# Patient Record
Sex: FEMALE | Race: WHITE | NOT HISPANIC OR LATINO | Employment: OTHER | URBAN - METROPOLITAN AREA
[De-identification: names, ages, dates, MRNs, and addresses within clinical notes are randomized per-mention and may not be internally consistent; named-entity substitution may affect disease eponyms.]

---

## 2017-01-04 ENCOUNTER — APPOINTMENT (OUTPATIENT)
Dept: URGENT CARE | Age: 77
End: 2017-01-04
Payer: MEDICARE

## 2017-01-04 ENCOUNTER — HOSPITAL ENCOUNTER (OUTPATIENT)
Dept: RADIOLOGY | Age: 77
Discharge: HOME | End: 2017-01-04
Attending: FAMILY MEDICINE | Admitting: FAMILY MEDICINE
Payer: MEDICARE

## 2017-01-04 ENCOUNTER — TRANSCRIBE ORDERS (OUTPATIENT)
Dept: URGENT CARE | Age: 77
End: 2017-01-04

## 2017-01-04 DIAGNOSIS — S99.929A INJURY OF FOOT: ICD-10-CM

## 2017-01-04 PROCEDURE — 73630 X-RAY EXAM OF FOOT: CPT

## 2017-01-04 PROCEDURE — G0463 HOSPITAL OUTPT CLINIC VISIT: HCPCS | Performed by: FAMILY MEDICINE

## 2017-01-04 PROCEDURE — 99203 OFFICE O/P NEW LOW 30 MIN: CPT | Performed by: FAMILY MEDICINE

## 2017-04-24 ENCOUNTER — TRANSCRIBE ORDERS (OUTPATIENT)
Dept: ADMINISTRATIVE | Age: 77
End: 2017-04-24

## 2017-04-24 ENCOUNTER — HOSPITAL ENCOUNTER (OUTPATIENT)
Dept: RADIOLOGY | Age: 77
Discharge: HOME | End: 2017-04-24
Payer: MEDICARE

## 2017-04-24 DIAGNOSIS — M25.512 LEFT SHOULDER PAIN, UNSPECIFIED CHRONICITY: Primary | ICD-10-CM

## 2017-04-24 DIAGNOSIS — M25.512 LEFT SHOULDER PAIN, UNSPECIFIED CHRONICITY: ICD-10-CM

## 2017-04-24 PROCEDURE — 73030 X-RAY EXAM OF SHOULDER: CPT

## 2017-09-20 ENCOUNTER — TRANSCRIBE ORDERS (OUTPATIENT)
Dept: URGENT CARE | Age: 77
End: 2017-09-20

## 2017-09-20 ENCOUNTER — OFFICE VISIT (OUTPATIENT)
Dept: URGENT CARE | Age: 77
End: 2017-09-20
Payer: MEDICARE

## 2017-09-20 ENCOUNTER — APPOINTMENT (OUTPATIENT)
Dept: RADIOLOGY | Age: 77
End: 2017-09-20
Payer: MEDICARE

## 2017-09-20 DIAGNOSIS — S99.929A INJURY OF FOOT: ICD-10-CM

## 2017-09-20 PROCEDURE — 99213 OFFICE O/P EST LOW 20 MIN: CPT | Performed by: FAMILY MEDICINE

## 2017-09-20 PROCEDURE — 73610 X-RAY EXAM OF ANKLE: CPT

## 2017-09-20 PROCEDURE — 73630 X-RAY EXAM OF FOOT: CPT

## 2017-09-20 PROCEDURE — G0463 HOSPITAL OUTPT CLINIC VISIT: HCPCS | Performed by: FAMILY MEDICINE

## 2017-12-26 ENCOUNTER — TRANSCRIBE ORDERS (OUTPATIENT)
Dept: URGENT CARE | Age: 77
End: 2017-12-26

## 2017-12-26 ENCOUNTER — OFFICE VISIT (OUTPATIENT)
Dept: URGENT CARE | Age: 77
End: 2017-12-26
Payer: MEDICARE

## 2017-12-26 ENCOUNTER — APPOINTMENT (OUTPATIENT)
Dept: RADIOLOGY | Age: 77
End: 2017-12-26
Attending: PHYSICIAN ASSISTANT
Payer: MEDICARE

## 2017-12-26 DIAGNOSIS — R07.81 PLEURODYNIA: ICD-10-CM

## 2017-12-26 PROCEDURE — 99213 OFFICE O/P EST LOW 20 MIN: CPT | Performed by: FAMILY MEDICINE

## 2017-12-26 PROCEDURE — 71101 X-RAY EXAM UNILAT RIBS/CHEST: CPT

## 2017-12-26 PROCEDURE — G0463 HOSPITAL OUTPT CLINIC VISIT: HCPCS | Performed by: FAMILY MEDICINE

## 2018-01-23 VITALS
DIASTOLIC BLOOD PRESSURE: 67 MMHG | BODY MASS INDEX: 31.15 KG/M2 | OXYGEN SATURATION: 97 % | WEIGHT: 165 LBS | HEIGHT: 61 IN | RESPIRATION RATE: 20 BRPM | SYSTOLIC BLOOD PRESSURE: 136 MMHG | TEMPERATURE: 98.6 F | HEART RATE: 72 BPM

## 2018-01-24 NOTE — MISCELLANEOUS
Message  Call to patient with regard to final report on rib series  Left 6 and 7 ribs fractures suggested in the anterolateral aspect  There is also mild to moderate compression fracture at T12 that is age indeterminate  These results were reviewed with patient  She says she gets injections in her back at times and does not think the T12 deformity is new  She is encouraged to follow up on the test results with her primary care provider  She voices understanding  Signatures   Electronically signed by :  Ericka Blount, [facility]; Dec 26 2017 12:57PM EST                       (Author)

## 2018-03-26 ENCOUNTER — OFFICE VISIT (OUTPATIENT)
Dept: URGENT CARE | Age: 78
End: 2018-03-26
Payer: MEDICARE

## 2018-03-26 VITALS
HEART RATE: 88 BPM | OXYGEN SATURATION: 97 % | HEIGHT: 61 IN | SYSTOLIC BLOOD PRESSURE: 134 MMHG | TEMPERATURE: 96.8 F | DIASTOLIC BLOOD PRESSURE: 70 MMHG | BODY MASS INDEX: 29.27 KG/M2 | WEIGHT: 155 LBS

## 2018-03-26 DIAGNOSIS — J06.9 ACUTE URI: Primary | ICD-10-CM

## 2018-03-26 PROCEDURE — G0463 HOSPITAL OUTPT CLINIC VISIT: HCPCS | Performed by: FAMILY MEDICINE

## 2018-03-26 PROCEDURE — 99213 OFFICE O/P EST LOW 20 MIN: CPT | Performed by: FAMILY MEDICINE

## 2018-03-26 RX ORDER — HYDROCHLOROTHIAZIDE 12.5 MG/1
12.5 TABLET ORAL
COMMUNITY
Start: 2018-01-31 | End: 2020-04-03 | Stop reason: ALTCHOICE

## 2018-03-26 RX ORDER — ALBUTEROL SULFATE 90 UG/1
2 AEROSOL, METERED RESPIRATORY (INHALATION) EVERY 6 HOURS PRN
Qty: 1 INHALER | Refills: 0 | Status: SHIPPED | OUTPATIENT
Start: 2018-03-26 | End: 2020-04-03 | Stop reason: ALTCHOICE

## 2018-03-26 RX ORDER — ROSUVASTATIN CALCIUM 10 MG/1
10 TABLET, COATED ORAL
COMMUNITY

## 2018-03-26 RX ORDER — AZITHROMYCIN 250 MG/1
TABLET, FILM COATED ORAL
Qty: 6 TABLET | Refills: 0 | Status: SHIPPED | OUTPATIENT
Start: 2018-03-26 | End: 2018-03-30

## 2018-03-26 RX ORDER — FLUTICASONE PROPIONATE 50 MCG
1 SPRAY, SUSPENSION (ML) NASAL DAILY
Qty: 16 G | Refills: 0 | Status: SHIPPED | OUTPATIENT
Start: 2018-03-26 | End: 2020-04-03 | Stop reason: ALTCHOICE

## 2018-03-26 RX ORDER — LEVOTHYROXINE SODIUM 112 UG/1
112 TABLET ORAL
COMMUNITY
Start: 2018-01-31

## 2018-03-26 RX ORDER — CARVEDILOL 12.5 MG/1
12.5 TABLET ORAL 2 TIMES DAILY
COMMUNITY

## 2018-03-26 RX ORDER — LISINOPRIL 10 MG/1
10 TABLET ORAL
COMMUNITY

## 2018-03-26 NOTE — PATIENT INSTRUCTIONS
Follow up with family practice this week as needed  Return to ER if new or worsening symptoms develop  Acute Bronchitis   WHAT YOU NEED TO KNOW:   Acute bronchitis is swelling and irritation in the air passages of your lungs  This irritation may cause you to cough or have other breathing problems  Acute bronchitis often starts because of another illness, such as a cold or the flu  The illness spreads from your nose and throat to your windpipe and airways  Bronchitis is often called a chest cold  Acute bronchitis lasts about 3 to 6 weeks and is usually not a serious illness  Your cough can last for several weeks  DISCHARGE INSTRUCTIONS:   Return to the emergency department if:   · You cough up blood  · Your lips or fingernails turn blue  · You feel like you are not getting enough air when you breathe  Contact your healthcare provider if:   · You have a fever  · Your breathing problems do not go away or get worse  · Your cough does not get better within 4 weeks  · You have questions or concerns about your condition or care  Self-care:   · Get more rest   Rest helps your body to heal  Slowly start to do more each day  Rest when you feel it is needed  · Avoid irritants in the air  Avoid chemicals, fumes, and dust  Wear a face mask if you must work around dust or fumes  Stay inside on days when air pollution levels are high  If you have allergies, stay inside when pollen counts are high  Do not use aerosol products, such as spray-on deodorant, bug spray, and hair spray  · Do not smoke or be around others who smoke  Nicotine and other chemicals in cigarettes and cigars damages the cilia that move mucus out of your lungs  Ask your healthcare provider for information if you currently smoke and need help to quit  E-cigarettes or smokeless tobacco still contain nicotine  Talk to your healthcare provider before you use these products  · Drink liquids as directed    Liquids help keep your air passages moist and help you cough up mucus  You may need to drink more liquids when you have acute bronchitis  Ask how much liquid to drink each day and which liquids are best for you  · Use a humidifier or vaporizer  Use a cool mist humidifier or a vaporizer to increase air moisture in your home  This may make it easier for you to breathe and help decrease your cough  Decrease risk for acute bronchitis:   · Get the vaccinations you need  Ask your healthcare provider if you should get vaccinated against the flu or pneumonia  · Prevent the spread of germs  You can decrease your risk of acute bronchitis and other illnesses by doing the following:     [facility] AUTHOR[facility] your hands often with soap and water  Carry germ-killing hand lotion or gel with you  You can use the lotion or gel to clean your hands when soap and water are not available  ¨ Do not touch your eyes, nose, or mouth unless you have washed your hands first     ¨ Always cover your mouth when you cough to prevent the spread of germs  It is best to cough into a tissue or your shirt sleeve instead of into your hand  Ask those around you cover their mouths when they cough  ¨ Try to avoid people who have a cold or the flu  If you are sick, stay away from others as much as possible  Medicines: Your healthcare provider may  give you any of the following:  · Ibuprofen or acetaminophen  are medicines that help lower your fever  They are available without a doctor's order  Ask your healthcare provider which medicine is right for you  Ask how much to take and how often to take it  Follow directions  These medicines can cause stomach bleeding if not taken correctly  Ibuprofen can cause kidney damage  Do not take ibuprofen if you have kidney disease, an ulcer, or allergies to aspirin  Acetaminophen can cause liver damage  Do not take more than 4,000 milligrams in 24 hours  · Decongestants  help loosen mucus in your lungs and make it easier to cough up   This can help you breathe easier  · Cough suppressants  decrease your urge to cough  If your cough produces mucus, do not take a cough suppressant unless your healthcare provider tells you to  Your healthcare provider may suggest that you take a cough suppressant at night so you can rest     · Inhalers  may be given  Your healthcare provider may give you one or more inhalers to help you breathe easier and cough less  An inhaler gives your medicine to open your airways  Ask your healthcare provider to show you how to use your inhaler correctly  · Take your medicine as directed  Contact your healthcare provider if you think your medicine is not helping or if you have side effects  Tell him of her if you are allergic to any medicine  Keep a list of the medicines, vitamins, and herbs you take  Include the amounts, and when and why you take them  Bring the list or the pill bottles to follow-up visits  Carry your medicine list with you in case of an emergency  Follow up with your healthcare provider as directed:  Write down questions you have so you will remember to ask them during your follow-up visits  © 2017 [employer] Information is for End User's use only and may not be sold, redistributed or otherwise used for commercial purposes  All illustrations and images included in CareNotes® are the copyrighted property of [employer]  or Travis Candelaria  The above information is an  only  It is not intended as medical advice for individual conditions or treatments  Talk to your doctor, nurse or pharmacist before following any medical regimen to see if it is safe and effective for you

## 2018-03-26 NOTE — PROGRESS NOTES
[facility]        NAME: Karyle Reasoner is a 68 y o  female  : 1940    MRN: [id number]  DATE: 2018  TIME: 10:55 AM    Assessment and Plan   Acute URI [J06 9]  1  Acute URI  azithromycin (ZITHROMAX) 250 mg tablet    fluticasone (FLONASE) 50 mcg/act nasal spray    albuterol (PROVENTIL HFA) 90 mcg/act inhaler         Patient Instructions       Follow up with PCP in 3-5 days  Proceed to  ER if symptoms worsen  Chief Complaint     Chief Complaint   Patient presents with    Cough     x1 week         History of Present Illness       67 yo F presents with 5 day history of productive cough, nasal congestion, subjective fevers last night  Pt eating and drinking normally   here with same symptoms  Pt not a smoker  No Chest pain  No SOB  Review of Systems   Review of Systems   Constitutional: Negative for chills, diaphoresis, fatigue and fever  HENT: Positive for congestion, postnasal drip, rhinorrhea, sinus pain and sinus pressure  Negative for ear pain, sore throat and voice change  Eyes: Negative  Respiratory: Negative for cough, chest tightness and shortness of breath  Cardiovascular: Negative for chest pain and palpitations  Gastrointestinal: Negative for constipation, diarrhea, nausea and vomiting  Endocrine: Negative  Genitourinary: Negative for dysuria  Musculoskeletal: Negative for back pain, myalgias and neck pain  Skin: Negative for pallor and rash  Allergic/Immunologic: Negative  Neurological: Negative for dizziness, syncope and headaches  Hematological: Negative  Psychiatric/Behavioral: Negative            Current Medications       Current Outpatient Prescriptions:     glucose blood (ONETOUCH VERIO) test strip,  ONE ONE TIME DAILY , Disp: , Rfl:     hydrochlorothiazide (HYDRODIURIL) 12 5 mg tablet, Take 12 5 mg by mouth, Disp: , Rfl:     levothyroxine 112 mcg tablet, Take 112 mcg by mouth, Disp: , Rfl:     albuterol (PROVENTIL HFA) 90 mcg/act inhaler, Inhale 2 puffs every 6 (six) hours as needed for wheezing or shortness of breath, Disp: 1 Inhaler, Rfl: 0    azithromycin (ZITHROMAX) 250 mg tablet, Take 2 tablets today then 1 tablet daily x 4 days, Disp: 6 tablet, Rfl: 0    carvedilol (COREG) 12 5 mg tablet, Take 12 5 mg by mouth, Disp: , Rfl:     fluticasone (FLONASE) 50 mcg/act nasal spray, 1 spray into each nostril daily, Disp: 16 g, Rfl: 0    lisinopril (ZESTRIL) 10 mg tablet, Take 10 mg by mouth, Disp: , Rfl:     metFORMIN (GLUCOPHAGE) 500 mg tablet, Take 500 mg by mouth, Disp: , Rfl:     rosuvastatin (CRESTOR) 10 MG tablet, Take 10 mg by mouth, Disp: , Rfl:     Current Allergies     Allergies as of 03/26/2018 - Reviewed 03/26/2018   Allergen Reaction Noted    Contrast dye [iodinated diagnostic agents]  03/26/2018            The following portions of the patient's history were reviewed and updated as appropriate: allergies, current medications, past family history, past medical history, past social history, past surgical history and problem list      Past Medical History:   Diagnosis Date    [facility] [address]     Diabetes mellitus [facility] [address] [facility]     Hypertension        Past Surgical History:   Procedure Laterality Date    CARPAL TUNNEL RELEASE      HYSTERECTOMY      SHOULDER DEBRIDEMENT      TONSILLECTOMY         No family history on file  Medications have been verified  Objective   /70   Pulse 88   Temp (!) 96 8 °F (36 °C)   Ht 5' 1" (1 549 m)   Wt 70 3 kg (155 lb)   SpO2 97%   BMI 29 29 kg/m²        Physical Exam     Physical Exam   Constitutional: She is oriented to person, place, and time  She appears well-developed and well-nourished  No distress  HENT:   Head: Normocephalic and atraumatic  Right Ear: External ear normal    Left Ear: External ear normal    Nose: Nose normal    Mouth/Throat: Oropharynx is clear and moist  No oropharyngeal exudate  Clear nasal Dc  Post nasal drip     Eyes: Conjunctivae and EOM are normal  Pupils are equal, round, and reactive to light  Right eye exhibits no discharge  Left eye exhibits no discharge  No scleral icterus  Neck: Normal range of motion  Neck supple  Cardiovascular: Normal rate, regular rhythm, normal heart sounds and intact distal pulses  Pulmonary/Chest: Effort normal and breath sounds normal  No respiratory distress  She has no wheezes  She has no rales  Musculoskeletal: She exhibits no edema or deformity  Neurological: She is alert and oriented to person, place, and time  Skin: Skin is warm  No rash noted  She is not diaphoretic  Nursing note and vitals reviewed  Due to length of symptoms and pt only experiencing moderate relief with mucinex I will rx a Z pack  Pt understands to f/u with PCP or go to Er if new or worsening sx occur

## 2018-04-28 NOTE — PROGRESS NOTES
Assessment   1  Rib pain on left side (786 50) (R07 81)   2  Accidental fall, initial encounter (E888 9) (W19 XXXA)   3  Rib fracture (807 00) (S22 39XA)    Plan   Rib pain on left side    · * XR RIBS LEFT W PA CHEST MIN 3 VIEWS; Status:Active; Requested for:[id number];     Discussion/Summary   Discussion Summary:    Deep breathing exercises as recommended  Warm or cold compresses to chest wall as needed  Medication from home as needed  Follow up with PCP in next 7-10 days if not improving or worsening  Chief Complaint   1  Pain  Chief Complaint Free Text Note Form: Last Monday she was on a chair hanging decoration and lost her balance and fell to the floor landing on her left side- c/o of rib pain located under breast area radiating into her back area since the incident- has not gotten better since the accident      History of Present Illness   HPI: 17-year-old female who was up on a chair trying to hang some [employer] decorations and fell injuring her left rib region  Hurts to cough or sneeze or take a deep breath  This happened a days ago  Denies any chest pain or shortness of breath  been using Valium to help with sleep  Taking Ibuprofen      Review of Systems   Focused-Female:      Constitutional: No fever, no chills, feels well, no tiredness, no recent weight gain or loss  Respiratory: as noted in HPI  Gastrointestinal: no complaints of abdominal pain, no constipation, no nausea or diarrhea, no vomiting, no bloody stools  Genitourinary: no complaints of dysuria, no incontinence, no pelvic pain, no dysmenorrhea, no vaginal discharge or abnormal vaginal bleeding  Musculoskeletal: as noted in HPI  Active Problems   1  Contusion of right foot, initial encounter (924 20) (S90 31XA)   2  Foot injury (959 7) (S99 929A)   3  Right ankle pain (719 47) (M25 571)    Past Medical History   1  History of Carcinoma of breast (174 9) (C50 919)   2   History of hypothyroidism (V12 29) (Z86 39) Pt states fell off a 6 foot ladder earlier. No LOC. C/o neck/back pain.  Has lec to palm of left hand 3  History of type 2 diabetes mellitus (V12 29) (Z86 39)  Active Problems And Past Medical History Reviewed: The active problems and past medical history were reviewed and updated today  Family History   Family History Reviewed: The family history was reviewed and updated today  Social History    · Alcohol use (V49 89) (Z78 9)   · Denied: History of Cigarette smoker   · Denied: History of Drug use  Social History Reviewed: The social history was reviewed and updated today  Surgical History   1  History of Breast Lumpectomy Location   2  History of Endarterectomy Common Carotid   3  History of Hysterectomy   4  History of Tonsillectomy   5  History of Wrist Carpectomy  Surgical History Reviewed: The surgical history was reviewed and updated today  Current Meds    1  Aspirin Buf(VaWmy-AgZpu-JxIth) 81 MG TBEC; Therapy: (RHLNLANH:96OKT1455) to Recorded   2  Bystolic 5 MG Oral Tablet; Therapy: (OSYSPIJK:32TTB9098) to Recorded   3  Crestor 10 MG Oral Tablet; Therapy: (GVKNGJVS:63QCJ8856) to Recorded   4  Daily Multi Oral Tablet; Therapy: (GFKMTDRD:05WNW2497) to Recorded   5  Fish Oil CAPS; Therapy: (QKNVFEJW:86ODS6246) to Recorded   6  HydroCHLOROthiazide TABS; Therapy: (DDUDWOOY:17BWP0471) to Recorded   7  MetFORMIN HCl - 500 MG Oral Tablet; Therapy: (WVIUFGNM:11JZL0490) to Recorded   8  Synthroid 112 MCG Oral Tablet; Therapy: (OGXUQALW:38HFP9480) to Recorded   9  Zestril TABS; Therapy: (PIMXZLHD:61SPN1240) to Recorded  Medication List Reviewed: The medication list was reviewed and updated today  Allergies   1   No Known Drug Allergies    Vitals   Signs   Recorded: [id number] 09:59AM   Temperature: 98 6 F  Heart Rate: 72  Respiration: 20  Systolic: 755  Diastolic: 67  Height: 5 ft 1 in  Weight: 165 lb   BMI Calculated: 31 18  BSA Calculated: 1 74  O2 Saturation: 97  Pain Scale: 8    Physical Exam        Constitutional Pleasant elderly well-developed well-nourished female in no acute distress sitting calmly and comfortably in exam room chair  Pulmonary      Respiratory effort: No increased work of breathing or signs of respiratory distress  Auscultation of lungs: Abnormal  -- patient does have pain with deep breath but no wheezes rales or rhonchi and good breath sounds throughout  Pain with AP compression that lateralizes on the left chest wall  Tender to palpation below the bra line in the anterolateral axillary region  Cardiovascular      Palpation of heart: Normal PMI, no thrills  Auscultation of heart: Normal rate and rhythm, normal S1 and S2, without murmurs  Skin      Skin and subcutaneous tissue: Normal without rashes or lesions  -- No obvious acute lesions of the chest wall  Psychiatric      Orientation to person, place, and time: Normal        Mood and affect: Normal        Results/Data   Diagnostic Studies Reviewed: I personally reviewed the films/images/results in the office today  My interpretation follows  X-ray Review rib series show some degenerative changes of the left 11th and 12th rib with some separation that could be acute  Await radiologist's final results  Signatures    Electronically signed by :  Myrna Sethi, [facility]; Dec 26 2017 10:42AM EST                       (Author)     Electronically signed by : Do Watters DO; Dec 26 2017 10:46AM EST                       (Co-author)

## 2020-03-31 PROBLEM — E03.9 HYPOTHYROIDISM: Status: ACTIVE | Noted: 2017-11-16

## 2020-03-31 PROBLEM — C50.919 BREAST CANCER (HCC): Status: ACTIVE | Noted: 2017-11-16

## 2020-04-03 ENCOUNTER — NURSING HOME VISIT (OUTPATIENT)
Dept: GERIATRICS | Facility: OTHER | Age: 80
End: 2020-04-03
Payer: MEDICARE

## 2020-04-03 VITALS
TEMPERATURE: 97.8 F | RESPIRATION RATE: 18 BRPM | SYSTOLIC BLOOD PRESSURE: 139 MMHG | OXYGEN SATURATION: 96 % | HEART RATE: 88 BPM | DIASTOLIC BLOOD PRESSURE: 65 MMHG

## 2020-04-03 DIAGNOSIS — D64.9 ACUTE ON CHRONIC ANEMIA: ICD-10-CM

## 2020-04-03 DIAGNOSIS — Z71.89 GOALS OF CARE, COUNSELING/DISCUSSION: ICD-10-CM

## 2020-04-03 DIAGNOSIS — E11.9 TYPE 2 DIABETES MELLITUS WITHOUT COMPLICATION, WITHOUT LONG-TERM CURRENT USE OF INSULIN (HCC): ICD-10-CM

## 2020-04-03 DIAGNOSIS — E87.1 HYPONATREMIA: ICD-10-CM

## 2020-04-03 DIAGNOSIS — S72.142D DISPLACED INTERTROCHANTERIC FRACTURE OF LEFT FEMUR, SUBSEQUENT ENCOUNTER FOR CLOSED FRACTURE WITH ROUTINE HEALING: Primary | ICD-10-CM

## 2020-04-03 DIAGNOSIS — I10 ESSENTIAL HYPERTENSION: ICD-10-CM

## 2020-04-03 DIAGNOSIS — R60.0 LOWER EXTREMITY EDEMA: ICD-10-CM

## 2020-04-03 DIAGNOSIS — R26.2 AMBULATORY DYSFUNCTION: ICD-10-CM

## 2020-04-03 DIAGNOSIS — R41.89 COGNITIVE IMPAIRMENT: ICD-10-CM

## 2020-04-03 PROCEDURE — 99316 NF DSCHRG MGMT 30 MIN+: CPT | Performed by: NURSE PRACTITIONER

## 2020-04-03 RX ORDER — LIDOCAINE 4 G/G
1 PATCH TOPICAL DAILY
COMMUNITY

## 2020-04-03 RX ORDER — FUROSEMIDE 20 MG/1
20 TABLET ORAL DAILY
COMMUNITY
End: 2020-04-06

## 2020-04-03 RX ORDER — SODIUM CHLORIDE 1000 MG
2 TABLET, SOLUBLE MISCELLANEOUS 2 TIMES DAILY
COMMUNITY

## 2020-04-03 RX ORDER — HYDROCODONE BITARTRATE AND ACETAMINOPHEN 5; 325 MG/1; MG/1
1 TABLET ORAL EVERY 6 HOURS PRN
Qty: 10 TABLET | Refills: 0 | Status: SHIPPED | OUTPATIENT
Start: 2020-04-03

## 2020-04-03 RX ORDER — ASPIRIN 81 MG/1
81 TABLET ORAL DAILY
COMMUNITY

## 2020-04-05 DIAGNOSIS — I50.22 CHRONIC SYSTOLIC CONGESTIVE HEART FAILURE (HCC): Primary | ICD-10-CM

## 2020-04-06 RX ORDER — FUROSEMIDE 20 MG/1
TABLET ORAL
Qty: 90 TABLET | Refills: 1 | Status: SHIPPED | OUTPATIENT
Start: 2020-04-06 | End: 2020-05-06

## 2021-11-10 ENCOUNTER — OFFICE VISIT (OUTPATIENT)
Dept: URGENT CARE | Age: 81
End: 2021-11-10
Payer: MEDICARE

## 2021-11-10 VITALS — OXYGEN SATURATION: 98 % | TEMPERATURE: 97.9 F | RESPIRATION RATE: 18 BRPM | HEART RATE: 88 BPM

## 2021-11-10 DIAGNOSIS — H60.391 INFECTION OF SKIN OF RIGHT EAR LOBE: Primary | ICD-10-CM

## 2021-11-10 PROCEDURE — 99213 OFFICE O/P EST LOW 20 MIN: CPT | Performed by: PHYSICIAN ASSISTANT

## 2021-11-10 PROCEDURE — G0463 HOSPITAL OUTPT CLINIC VISIT: HCPCS | Performed by: PHYSICIAN ASSISTANT

## 2021-11-10 RX ORDER — CEPHALEXIN 500 MG/1
500 CAPSULE ORAL EVERY 12 HOURS SCHEDULED
Qty: 14 CAPSULE | Refills: 0 | Status: SHIPPED | OUTPATIENT
Start: 2021-11-10 | End: 2021-11-17

## 2022-05-22 ENCOUNTER — OFFICE VISIT (OUTPATIENT)
Dept: URGENT CARE | Age: 82
End: 2022-05-22
Payer: MEDICARE

## 2022-05-22 VITALS
RESPIRATION RATE: 20 BRPM | DIASTOLIC BLOOD PRESSURE: 82 MMHG | OXYGEN SATURATION: 98 % | TEMPERATURE: 97.8 F | SYSTOLIC BLOOD PRESSURE: 141 MMHG | HEART RATE: 100 BPM

## 2022-05-22 DIAGNOSIS — S00.83XA CONTUSION OF FACE, INITIAL ENCOUNTER: Primary | ICD-10-CM
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
## 2022-05-22 PROCEDURE — 99213 OFFICE O/P EST LOW 20 MIN: CPT | Performed by: NURSE PRACTITIONER

## 2022-05-22 PROCEDURE — G0463 HOSPITAL OUTPT CLINIC VISIT: HCPCS | Performed by: NURSE PRACTITIONER

## 2022-05-22 NOTE — PROGRESS NOTES
[facility]        NAME: Skip Marina is a 80 y o  female  : 1940    MRN: [id number]  DATE: May 22, 2022  TIME: 3:49 PM    Assessment and Plan   Contusion of face, initial encounter [S00 83XA]  1  Contusion of face, initial encounter     2  Fall, initial encounter           Patient Instructions     Fall prevention counseling   Follow up with PCP in 3-5 days  Proceed to  ER if symptoms worsen  Chief Complaint     Chief Complaint   Patient presents with    Fall     Facial bruising, swelling around left periorbital area, small cut above left eye from glasses, no LOC, right knee sore         History of Present Illness       HPI   Reports she was walking into a restaurant last night and tripped over the carpet at the entrance and fell  Initial had some pain on the left side of the face, right great toe and right knee  The pain on the knee and great toe has resolved  Large bruise on the left side of the face  Denies confusion or LOC  No nausea or vomiting  Review of Systems   Review of Systems   Constitutional: Negative for activity change, appetite change and fatigue  Respiratory: Negative for shortness of breath  Cardiovascular: Negative for chest pain  Gastrointestinal: Negative for nausea and vomiting  Musculoskeletal: Positive for arthralgias (right knee)  Skin: Positive for color change (brusing on the face and toe) and wound (face)  Neurological: Negative for tremors, syncope and light-headedness           Current Medications       Current Outpatient Medications:     aspirin (ECOTRIN LOW STRENGTH) 81 mg EC tablet, Take 81 mg by mouth daily, Disp: , Rfl:     carvedilol (COREG) 12 5 mg tablet, Take 12 5 mg by mouth 2 (two) times a day, Disp: , Rfl:     glucose blood test strip,  ONE ONE TIME DAILY , Disp: , Rfl:     hydrochlorothiazide (HYDRODIURIL) 12 5 mg tablet, , Disp: , Rfl:     HYDROcodone-acetaminophen (NORCO) 5-325 mg per tablet, Take 1 tablet by mouth every 6 (six) hours as needed for pain for up to 10 dosesMax Daily Amount: 4 tablets, Disp: 10 tablet, Rfl: 0    levothyroxine 112 mcg tablet, Take 112 mcg by mouth, Disp: , Rfl:     Lidocaine 4 % PTCH, Apply 1 patch topically daily, Disp: , Rfl:     lisinopril (ZESTRIL) 10 mg tablet, Take 10 mg by mouth, Disp: , Rfl:     metFORMIN (GLUCOPHAGE) 500 mg tablet, Take 500 mg by mouth, Disp: , Rfl:     rosuvastatin (CRESTOR) 10 MG tablet, Take 10 mg by mouth, Disp: , Rfl:     rosuvastatin (CRESTOR) 20 MG tablet, , Disp: , Rfl:     sodium chloride 1 g tablet, Take 2 g by mouth 2 (two) times a day, Disp: , Rfl:     furosemide (LASIX) 20 mg tablet, TAKE 1 TABLET BY MOUTH EVERY DAY, Disp: 90 tablet, Rfl: 1    Current Allergies     Allergies as of 05/22/2022 - Reviewed 05/22/2022   Allergen Reaction Noted    Contrast dye [iodinated diagnostic agents]  03/26/2018            The following portions of the patient's history were reviewed and updated as appropriate: allergies, current medications, past family history, past medical history, past social history, past surgical history and problem list      Past Medical History:   Diagnosis Date    [facility] [address] [facility]     Diabetes mellitus [facility] [address] [facility]     Disease of thyroid gland     Hypertension        Past Surgical History:   Procedure Laterality Date    BREAST LUMPECTOMY      CAROTID ARTERY - SUBCLAVIAN ARTERY BYPASS GRAFT      CARPAL TUNNEL RELEASE      FEMUR SURGERY      HYSTERECTOMY      KNEE SURGERY      SHOULDER DEBRIDEMENT      TONSILLECTOMY         No family history on file  Medications have been verified  Objective   /82   Pulse 100   Temp 97 8 °F (36 6 °C)   Resp 20   SpO2 98%   No LMP recorded  Patient has had a hysterectomy  Physical Exam     Physical Exam  Constitutional:       Appearance: She is not ill-appearing or diaphoretic  Eyes:      General:         Right eye: No discharge  Left eye: No discharge        Extraocular Movements: Extraocular movements intact  Conjunctiva/sclera: Conjunctivae normal       Pupils: Pupils are equal, round, and reactive to light  Comments: No drainage or wetness of abrasion in the abrasion in the eyebrow  No tenderness with palpation of the orbital bone   Musculoskeletal:         General: Tenderness (mild tenderness with palpation of the right great toe  ALso with palpation of the bruise of the left periobital area) present  No swelling or deformity  Comments: No pain with palpation of the right knee   Skin:     Coloration: Skin is not jaundiced  Findings: Bruising (periorbital swelling of the left orbit ) present

## 2023-07-20 DIAGNOSIS — I10 PRIMARY HYPERTENSION: Primary | ICD-10-CM

## 2023-07-20 RX ORDER — METOPROLOL SUCCINATE 25 MG/1
TABLET, EXTENDED RELEASE ORAL
COMMUNITY
Start: 2023-06-22

## 2023-07-20 RX ORDER — OXYBUTYNIN CHLORIDE 10 MG/1
TABLET, EXTENDED RELEASE ORAL
COMMUNITY
Start: 2023-05-25

## 2023-07-20 RX ORDER — ALPRAZOLAM 0.25 MG/1
TABLET ORAL
COMMUNITY
Start: 2023-06-10

## 2023-07-20 RX ORDER — LIDOCAINE 50 MG/G
PATCH TOPICAL
COMMUNITY
Start: 2023-06-27

## 2023-07-20 RX ORDER — TRAMADOL HYDROCHLORIDE 50 MG/1
TABLET ORAL
COMMUNITY
Start: 2023-05-04

## 2023-07-20 RX ORDER — LOSARTAN POTASSIUM 25 MG/1
25 TABLET ORAL DAILY
COMMUNITY
Start: 2023-06-09

## 2023-07-20 RX ORDER — METFORMIN HYDROCHLORIDE 500 MG/1
TABLET, EXTENDED RELEASE ORAL
COMMUNITY
Start: 2023-05-25

## 2023-07-20 RX ORDER — LEVOTHYROXINE SODIUM 0.1 MG/1
TABLET ORAL
COMMUNITY
Start: 2023-06-07

## 2023-07-20 RX ORDER — APIXABAN 5 MG/1
TABLET, FILM COATED ORAL
COMMUNITY
Start: 2023-07-19

## 2023-07-26 RX ORDER — METOPROLOL SUCCINATE 25 MG/1
25 TABLET, EXTENDED RELEASE ORAL DAILY
Qty: 90 TABLET | Refills: 0 | OUTPATIENT
Start: 2023-07-26 | End: 2023-10-26

## 2023-08-30 ENCOUNTER — TELEPHONE (OUTPATIENT)
Dept: INTERNAL MEDICINE CLINIC | Facility: CLINIC | Age: 83
End: 2023-08-30

## 2023-08-30 ENCOUNTER — OFFICE VISIT (OUTPATIENT)
Dept: INTERNAL MEDICINE CLINIC | Facility: CLINIC | Age: 83
End: 2023-08-30
Payer: MEDICARE

## 2023-08-30 VITALS
WEIGHT: 131 LBS | SYSTOLIC BLOOD PRESSURE: 132 MMHG | HEIGHT: 59 IN | BODY MASS INDEX: 26.41 KG/M2 | DIASTOLIC BLOOD PRESSURE: 82 MMHG | HEART RATE: 93 BPM | TEMPERATURE: 98.1 F | OXYGEN SATURATION: 97 %

## 2023-08-30 DIAGNOSIS — Z96.651 HISTORY OF ARTHROPLASTY OF RIGHT KNEE: ICD-10-CM

## 2023-08-30 DIAGNOSIS — I48.0 PAF (PAROXYSMAL ATRIAL FIBRILLATION) (HCC): ICD-10-CM

## 2023-08-30 DIAGNOSIS — R26.2 AMBULATORY DYSFUNCTION: ICD-10-CM

## 2023-08-30 DIAGNOSIS — E55.9 VITAMIN D DEFICIENCY: ICD-10-CM

## 2023-08-30 DIAGNOSIS — E87.1 HYPONATREMIA: ICD-10-CM

## 2023-08-30 DIAGNOSIS — Z98.890 HISTORY OF LEFT-SIDED CAROTID ENDARTERECTOMY: ICD-10-CM

## 2023-08-30 DIAGNOSIS — C50.911 MALIGNANT NEOPLASM OF RIGHT FEMALE BREAST, UNSPECIFIED ESTROGEN RECEPTOR STATUS, UNSPECIFIED SITE OF BREAST (HCC): ICD-10-CM

## 2023-08-30 DIAGNOSIS — E11.69 TYPE 2 DIABETES MELLITUS WITH OTHER SPECIFIED COMPLICATION, UNSPECIFIED WHETHER LONG TERM INSULIN USE (HCC): Primary | ICD-10-CM

## 2023-08-30 DIAGNOSIS — D50.9 IRON DEFICIENCY ANEMIA, UNSPECIFIED IRON DEFICIENCY ANEMIA TYPE: ICD-10-CM

## 2023-08-30 DIAGNOSIS — I10 PRIMARY HYPERTENSION: ICD-10-CM

## 2023-08-30 DIAGNOSIS — E03.9 ACQUIRED HYPOTHYROIDISM: ICD-10-CM

## 2023-08-30 DIAGNOSIS — E78.2 MIXED HYPERLIPIDEMIA: ICD-10-CM

## 2023-08-30 PROCEDURE — 99204 OFFICE O/P NEW MOD 45 MIN: CPT | Performed by: INTERNAL MEDICINE

## 2023-08-30 RX ORDER — BLOOD SUGAR DIAGNOSTIC
STRIP MISCELLANEOUS DAILY
COMMUNITY
Start: 2023-03-24

## 2023-08-30 RX ORDER — METOPROLOL SUCCINATE 25 MG/1
25 TABLET, EXTENDED RELEASE ORAL DAILY
Start: 2023-08-30

## 2023-08-30 RX ORDER — LANCETS 33 GAUGE
1 EACH MISCELLANEOUS DAILY
COMMUNITY
Start: 2023-03-25 | End: 2024-01-16 | Stop reason: SDUPTHER

## 2023-08-30 RX ORDER — FERROUS SULFATE 324(65)MG
324 TABLET, DELAYED RELEASE (ENTERIC COATED) ORAL
COMMUNITY

## 2023-08-30 NOTE — PROGRESS NOTES
Diabetic Foot Exam    Patient's shoes and socks removed.    Right Foot/Ankle   Right Foot Inspection  Skin Exam: skin normal and skin intact. No dry skin, no warmth, no callus, no erythema, no maceration, no abnormal color, no pre-ulcer, no ulcer and no callus.     Sensory   Monofilament testing: intact    Vascular  The right DP pulse is 1+. The right PT pulse is 1+.     Left Foot/Ankle  Left Foot Inspection  Skin Exam: skin normal and skin intact. No dry skin, no warmth, no erythema, no maceration, normal color, no pre-ulcer, no ulcer and no callus.     Sensory   Monofilament testing: intact    Vascular  The left DP pulse is 1+. The left PT pulse is 1+.     Assign Risk Category  No deformity present  No loss of protective sensation  No weak pulses  Risk: 0

## 2023-08-30 NOTE — PROGRESS NOTES
Assessment/Plan:    BMI Counseling: Body mass index is 26.46 kg/m². The BMI is above normal. Exercise recommendations include exercising 3-5 times per week.           1. Type 2 diabetes mellitus with other specified complication, unspecified whether long term insulin use (HCC)  Comments:  Stable continue metformin.  Orders:  -     Ambulatory Referral to Ophthalmology; Future  -     Albumin / creatinine urine ratio  -     Hemoglobin A1C; Future    2. History of left-sided carotid endarterectomy  Comments:  This is stable and is being monitored.    3. PAF (paroxysmal atrial fibrillation) ([facility])  Comments:  Continue Eliquis and metoprolol.  Orders:  -     metoprolol succinate (TOPROL-XL) 25 mg 24 hr tablet; Take 1 tablet (25 mg total) by mouth daily    4. Malignant neoplasm of right female breast, unspecified estrogen receptor status, unspecified site of breast ([facility])    5. History of arthroplasty of right knee    6. Acquired hypothyroidism  Comments:  Pertinent labs were reviewed pertinent labs and other investigations were reviewed.  Continue current management.  Orders:  -     T4, free; Future  -     TSH, 3rd generation; Future    7. Primary hypertension  Comments:  Pertinent labs were reviewed pertinent labs and other investigations were reviewed.  Continue current management.  Orders:  -     Urinalysis with microscopic    8. Ambulatory dysfunction    9. Hyponatremia    10. Iron deficiency anemia, unspecified iron deficiency anemia type  -     TIBC Panel (incl. Iron, TIBC, % Iron Saturation); Future    11. Vitamin D deficiency  -     Vitamin D 25 hydroxy; Future    12. Mixed hyperlipidemia  -     CBC and differential; Future  -     Comprehensive metabolic panel; Future  -     Lipid panel; Future           1. Type 2 diabetes mellitus with other specified complication, unspecified whether long term insulin use (HCC)    - Ambulatory Referral to Ophthalmology; Future    2. History of left-sided carotid  endarterectomy      3. PAF (paroxysmal atrial fibrillation) (HCC)      4. Malignant neoplasm of right female breast, unspecified estrogen receptor status, unspecified site of breast (HCC)      5. History of arthroplasty of right knee      6. Acquired hypothyroidism      7. Primary hypertension      8. Ambulatory dysfunction      9. Hyponatremia         No problem-specific Assessment & Plan notes found for this encounter.           Subjective:      Patient ID: Juliana Tucker is a 83 y.o. female.    HPI    The following portions of the patient's history were reviewed and updated as appropriate: She  has a past medical history of Arthritis, Breast CA (HCC), Diabetes mellitus (HCC), Disease of thyroid gland, Heart attack (HCC), Hypertension, and 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HCC).  She   Patient Active Problem List    Diagnosis Date Noted    Parotitis 12/26/2023    Iron deficiency anemia 10/12/2023    History of left-sided carotid endarterectomy 08/30/2023    PAF (paroxysmal atrial fibrillation) (HCC) 08/30/2023    History of arthroplasty of right knee 08/30/2023    Displaced intertrochanteric fracture of left femur, subsequent encounter for closed fracture with routine healing 04/03/2020    Hypertension 04/03/2020    Hyponatremia 04/03/2020    Lower extremity edema 04/03/2020    Cognitive impairment 04/03/2020    Ambulatory dysfunction 04/03/2020    Acute on chronic anemia 04/03/2020    Goals of care, counseling/discussion 04/03/2020    Type 2 diabetes mellitus with stage 3b chronic kidney disease, without long-term current use of insulin (HCC)     Breast cancer (HCC) 11/16/2017    Hypothyroidism 11/16/2017     She  has a past surgical history that includes Tonsillectomy; Carpal tunnel release; Shoulder Debridement; Breast lumpectomy; Carotid artery - subclavian artery bypass graft; Knee surgery; Femur Surgery; Total abdominal hysterectomy w/ bilateral salpingoophorectomy; and Carpal tunnel release (Bilateral).  Her family  history includes Arthritis in her family; Cancer in her family; Heart attack in her brother and father; Heart disease in her family; Hypertension in her family; Stroke in her mother.  She  reports that she has never smoked. She has never used smokeless tobacco. She reports current alcohol use. She reports current drug use. Drug: Marijuana.  Current Outpatient Medications   Medication Sig Dispense Refill    ALPRAZolam (XANAX) 0.25 mg tablet Take 0.25 mg by mouth daily as needed for anxiety      carvedilol (COREG) 12.5 mg tablet Take 12.5 mg by mouth 2 (two) times a day (Patient not taking: Reported on 10/12/2023)      Cholecalciferol 50 MCG (2000 UT) CAPS Take 1 capsule by mouth daily      cyanocobalamin (VITAMIN B-12) 1000 MCG tablet Take 1,000 mcg by mouth daily      Eliquis 5 MG Take 5 mg by mouth 2 (two) times a day      Fe Asp Gly-Fe Polysac-Suc Ac-C (FERREX 150 PLUS PO) Take by mouth in the morning      ferrous sulfate 324 MG TBEC Take 324 mg by mouth daily with breakfast      glucose blood (OneTouch Verio) test strip daily      hydrochlorothiazide (HYDRODIURIL) 12.5 mg tablet Take 12.5 mg by mouth if needed (edema)      Lidocaine 4 % PTCH Apply 1 patch topically daily      lisinopril (ZESTRIL) 10 mg tablet Take 10 mg by mouth daily      losartan (COZAAR) 25 mg tablet Take 25 mg by mouth daily      metFORMIN (GLUCOPHAGE) 500 mg tablet Take 500 mg by mouth      metoprolol succinate (TOPROL-XL) 25 mg 24 hr tablet Take 1 tablet (25 mg total) by mouth daily      oxybutynin (DITROPAN-XL) 10 MG 24 hr tablet Take 10 mg by mouth every morning      rosuvastatin (CRESTOR) 20 MG tablet Take 20 mg by mouth every evening      aspirin (ECOTRIN LOW STRENGTH) 81 mg EC tablet Take 81 mg by mouth daily (Patient not taking: Reported on 8/30/2023)      furosemide (LASIX) 20 mg tablet TAKE 1 TABLET BY MOUTH EVERY DAY 90 tablet 1    glucose blood test strip  ONE ONE TIME DAILY  (Patient not taking: Reported on 8/30/2023)       HYDROcodone-acetaminophen (NORCO) 5-325 mg per tablet Take 1 tablet by mouth every 6 (six) hours as needed for pain for up to 10 dosesMax Daily Amount: 4 tablets (Patient not taking: Reported on 8/30/2023) 10 tablet 0    Lancets 33G MISC Use 1 each daily 100 each 3    levothyroxine 100 mcg tablet Take 1 tablet (100 mcg total) by mouth daily 90 tablet 0    lidocaine (LIDODERM) 5 % PLACE 1 PATCH ON THE SKIN EVERY 12 HOURS. REMOVE & DISCARD PATCH WITHIN 12 HOURS OR AS DIRECTED BY MD (Patient not taking: Reported on 8/30/2023)      metFORMIN (GLUCOPHAGE-XR) 500 mg 24 hr tablet  (Patient not taking: Reported on 8/30/2023)      rosuvastatin (CRESTOR) 10 MG tablet Take 10 mg by mouth (Patient not taking: Reported on 8/30/2023)      sodium chloride 1 g tablet Take 2 g by mouth 2 (two) times a day (Patient not taking: Reported on 8/30/2023)      traMADol (ULTRAM) 50 mg tablet every 6 (six) hours as needed       No current facility-administered medications for this visit.     Current Outpatient Medications on File Prior to Visit   Medication Sig    ALPRAZolam (XANAX) 0.25 mg tablet Take 0.25 mg by mouth daily as needed for anxiety    carvedilol (COREG) 12.5 mg tablet Take 12.5 mg by mouth 2 (two) times a day (Patient not taking: Reported on 10/12/2023)    Cholecalciferol 50 MCG (2000 UT) CAPS Take 1 capsule by mouth daily    cyanocobalamin (VITAMIN B-12) 1000 MCG tablet Take 1,000 mcg by mouth daily    Eliquis 5 MG Take 5 mg by mouth 2 (two) times a day    Fe Asp Gly-Fe Polysac-Suc Ac-C (FERREX 150 PLUS PO) Take by mouth in the morning    ferrous sulfate 324 MG TBEC Take 324 mg by mouth daily with breakfast    glucose blood (OneTouch Verio) test strip daily    hydrochlorothiazide (HYDRODIURIL) 12.5 mg tablet Take 12.5 mg by mouth if needed (edema)    Lidocaine 4 % PTCH Apply 1 patch topically daily    lisinopril (ZESTRIL) 10 mg tablet Take 10 mg by mouth daily    losartan (COZAAR) 25 mg tablet Take 25 mg by mouth daily     metFORMIN (GLUCOPHAGE) 500 mg tablet Take 500 mg by mouth    oxybutynin (DITROPAN-XL) 10 MG 24 hr tablet Take 10 mg by mouth every morning    rosuvastatin (CRESTOR) 20 MG tablet Take 20 mg by mouth every evening    aspirin (ECOTRIN LOW STRENGTH) 81 mg EC tablet Take 81 mg by mouth daily (Patient not taking: Reported on 8/30/2023)    furosemide (LASIX) 20 mg tablet TAKE 1 TABLET BY MOUTH EVERY DAY    glucose blood test strip  ONE ONE TIME DAILY  (Patient not taking: Reported on 8/30/2023)    HYDROcodone-acetaminophen (NORCO) 5-325 mg per tablet Take 1 tablet by mouth every 6 (six) hours as needed for pain for up to 10 dosesMax Daily Amount: 4 tablets (Patient not taking: Reported on 8/30/2023)    lidocaine (LIDODERM) 5 % PLACE 1 PATCH ON THE SKIN EVERY 12 HOURS. REMOVE & DISCARD PATCH WITHIN 12 HOURS OR AS DIRECTED BY MD (Patient not taking: Reported on 8/30/2023)    metFORMIN (GLUCOPHAGE-XR) 500 mg 24 hr tablet  (Patient not taking: Reported on 8/30/2023)    rosuvastatin (CRESTOR) 10 MG tablet Take 10 mg by mouth (Patient not taking: Reported on 8/30/2023)    sodium chloride 1 g tablet Take 2 g by mouth 2 (two) times a day (Patient not taking: Reported on 8/30/2023)    traMADol (ULTRAM) 50 mg tablet every 6 (six) hours as needed     No current facility-administered medications on file prior to visit.     Medications Discontinued During This Encounter   Medication Reason    metoprolol succinate (KAPSPARGO SPRINKLE) 25 MG extended-release capsule     metoprolol succinate (TOPROL-XL) 25 mg 24 hr tablet Reorder      She has No Known Allergies..    Review of Systems   Constitutional:  Negative for appetite change, chills, fatigue and fever.   HENT:  Negative for sore throat and trouble swallowing.    Eyes:  Negative for redness.   Respiratory:  Negative for shortness of breath.    Cardiovascular:  Negative for chest pain and palpitations.   Gastrointestinal:  Negative for abdominal pain, constipation and diarrhea.  "  Genitourinary:  Negative for dysuria and hematuria.   Musculoskeletal:  Positive for gait problem. Negative for back pain and neck pain.   Skin:  Negative for rash.   Neurological:  Negative for seizures, weakness and headaches.   Hematological:  Negative for adenopathy.   Psychiatric/Behavioral:  Negative for confusion. The patient is not nervous/anxious.          Objective:      /82 (BP Location: Left arm, Patient Position: Sitting, Cuff Size: Adult)   Pulse 93   Temp 98.1 °F (36.7 °C) (Temporal)   Ht 4' 11\" (1.499 m)   Wt 59.4 kg (131 lb)   SpO2 97% Comment: ra  BMI 26.46 kg/m²     Results Reviewed       None            No results found for this or any previous visit (from the past 1344 hour(s)).     Physical Exam  Constitutional:       General: She is not in acute distress.     Appearance: Normal appearance.   HENT:      Head: Normocephalic and atraumatic.      Nose: Nose normal.      Mouth/Throat:      Mouth: Mucous membranes are moist.   Eyes:      Extraocular Movements: Extraocular movements intact.      Pupils: Pupils are equal, round, and reactive to light.   Cardiovascular:      Rate and Rhythm: Normal rate. Rhythm irregular.      Pulses: Normal pulses.      Heart sounds: Normal heart sounds. No murmur heard.     No friction rub.   Pulmonary:      Effort: Pulmonary effort is normal. No respiratory distress.      Breath sounds: Normal breath sounds. No wheezing.   Abdominal:      General: Abdomen is flat. Bowel sounds are normal. There is no distension.      Palpations: Abdomen is soft. There is no mass.      Tenderness: There is no abdominal tenderness. There is no guarding.   Musculoskeletal:         General: Normal range of motion.   Neurological:      General: No focal deficit present.      Mental Status: She is alert and oriented to person, place, and time. Mental status is at baseline.      Cranial Nerves: No cranial nerve deficit.      Gait: Gait abnormal.   Psychiatric:         Mood " and Affect: Mood normal.         Behavior: Behavior normal.       BMI Counseling: Body mass index is 26.46 kg/m². The BMI is above normal. Exercise recommendations include exercising 3-5 times per week.

## 2023-08-31 ENCOUNTER — TELEPHONE (OUTPATIENT)
Dept: ADMINISTRATIVE | Facility: OTHER | Age: 83
End: 2023-08-31

## 2023-08-31 NOTE — LETTER
Diabetic Eye Exam Form    Date Requested: 23  Patient: Yvonne Morgan  Patient : 1940   Referring Provider: Michaela Milian MD      DIABETIC Eye Exam Date _______________________________      Type of Exam MUST be documented for Diabetic Eye Exams. Please CHECK ONE. Retinal Exam       Dilated Retinal Exam       OCT       Optomap-Iris Exam      Fundus Photography       Left Eye - Please check Retinopathy or No Retinopathy        Exam did show retinopathy    Exam did not show retinopathy       Right Eye - Please check Retinopathy or No Retinopathy       Exam did show retinopathy    Exam did not show retinopathy       Comments __________________________________________________________    Practice Providing Exam ______________________________________________    Exam Performed By (print name) _______________________________________      Provider Signature ___________________________________________________      These reports are needed for  compliance. Please fax this completed form and a copy of the Diabetic Eye Exam report to our office located at [address] [place] [address] as soon as possible via Fax [phone] attention Harsha Tomlin: Phone [phone]  We thank you for your assistance in treating our mutual patient.

## 2023-08-31 NOTE — LETTER
Diabetic Eye Exam Form    Date Requested: 09/15/23  Patient: Yaritza Bynum  Patient : 1940   Referring Provider: Courtney Simpson MD      DIABETIC Eye Exam Date _______________________________      Type of Exam MUST be documented for Diabetic Eye Exams. Please CHECK ONE. Retinal Exam       Dilated Retinal Exam       OCT       Optomap-Iris Exam      Fundus Photography       Left Eye - Please check Retinopathy or No Retinopathy        Exam did show retinopathy    Exam did not show retinopathy       Right Eye - Please check Retinopathy or No Retinopathy       Exam did show retinopathy    Exam did not show retinopathy       Comments __________________________________________________________    Practice Providing Exam ______________________________________________    Exam Performed By (print name) _______________________________________      Provider Signature ___________________________________________________      These reports are needed for  compliance. Please fax this completed form and a copy of the Diabetic Eye Exam report to our office located at [address] [place] [address] as soon as possible via Fax [phone] attention [place]: Phone [phone]  We thank you for your assistance in treating our mutual patient.

## 2023-08-31 NOTE — TELEPHONE ENCOUNTER
----- Message from Adrian Gerard sent at 8/30/2023  2:25 PM EDT -----  Regarding: dm eye  08/30/23 2:26 PM    Hello, our patient Bill Mcneal has had Diabetic Eye Exam completed/performed. Please assist in updating the patient chart by making an External outreach to dr Enedina Garcia facility located in [facility]. The date of service is 2023.     Thank you,  Janelle Ovalle MA  [facility] INTERNAL MED

## 2023-09-01 NOTE — TELEPHONE ENCOUNTER
Upon review of the In Basket request and the patient's chart, initial outreach has been made via fax to facility. Please see Contacts section for details.      Thank you  Claudetta Smoker

## 2023-09-15 NOTE — TELEPHONE ENCOUNTER
As a follow-up, a second attempt has been made for outreach via fax to facility. Please see Contacts section for details.     Thank you  Mally Dey

## 2023-09-25 NOTE — TELEPHONE ENCOUNTER
As a final attempt, a third outreach has been made via telephone call to facility. Please see Contacts section for details. This encounter will be closed and completed by end of day. Should we receive the requested information because of previous outreach attempts, the requested patient's chart will be updated appropriately.      Thank you  Mohan Lemons

## 2023-09-29 NOTE — TELEPHONE ENCOUNTER
Upon review of the In Basket request we have found as a result of outreach that patient did not have the requested item(s) completed. Per office, patient has not been seen for DM eye exam since 2021. Any additional questions or concerns should be emailed to the Practice Liaisons via the appropriate education email address, please do not reply via In Basket.     Thank you  Poly Rubio

## 2023-10-12 ENCOUNTER — OFFICE VISIT (OUTPATIENT)
Dept: INTERNAL MEDICINE CLINIC | Facility: CLINIC | Age: 83
End: 2023-10-12
Payer: MEDICARE

## 2023-10-12 VITALS
BODY MASS INDEX: 26 KG/M2 | DIASTOLIC BLOOD PRESSURE: 80 MMHG | WEIGHT: 129 LBS | SYSTOLIC BLOOD PRESSURE: 126 MMHG | HEIGHT: 59 IN | HEART RATE: 97 BPM | TEMPERATURE: 98 F | OXYGEN SATURATION: 97 %

## 2023-10-12 DIAGNOSIS — Z78.0 ENCOUNTER FOR OSTEOPOROSIS SCREENING IN ASYMPTOMATIC POSTMENOPAUSAL PATIENT: ICD-10-CM

## 2023-10-12 DIAGNOSIS — C50.911 MALIGNANT NEOPLASM OF RIGHT FEMALE BREAST, UNSPECIFIED ESTROGEN RECEPTOR STATUS, UNSPECIFIED SITE OF BREAST (HCC): ICD-10-CM

## 2023-10-12 DIAGNOSIS — Z13.820 ENCOUNTER FOR OSTEOPOROSIS SCREENING IN ASYMPTOMATIC POSTMENOPAUSAL PATIENT: ICD-10-CM

## 2023-10-12 DIAGNOSIS — Z98.890 HISTORY OF LEFT-SIDED CAROTID ENDARTERECTOMY: ICD-10-CM

## 2023-10-12 DIAGNOSIS — N18.32 TYPE 2 DIABETES MELLITUS WITH STAGE 3B CHRONIC KIDNEY DISEASE, WITHOUT LONG-TERM CURRENT USE OF INSULIN (HCC): ICD-10-CM

## 2023-10-12 DIAGNOSIS — E11.22 TYPE 2 DIABETES MELLITUS WITH STAGE 3B CHRONIC KIDNEY DISEASE, WITHOUT LONG-TERM CURRENT USE OF INSULIN (HCC): ICD-10-CM

## 2023-10-12 DIAGNOSIS — Z23 ENCOUNTER FOR IMMUNIZATION: ICD-10-CM

## 2023-10-12 DIAGNOSIS — I48.0 PAF (PAROXYSMAL ATRIAL FIBRILLATION) (HCC): ICD-10-CM

## 2023-10-12 DIAGNOSIS — Z00.00 MEDICARE ANNUAL WELLNESS VISIT, SUBSEQUENT: ICD-10-CM

## 2023-10-12 DIAGNOSIS — D50.9 IRON DEFICIENCY ANEMIA, UNSPECIFIED IRON DEFICIENCY ANEMIA TYPE: ICD-10-CM

## 2023-10-12 DIAGNOSIS — E11.69 TYPE 2 DIABETES MELLITUS WITH OTHER SPECIFIED COMPLICATION, UNSPECIFIED WHETHER LONG TERM INSULIN USE (HCC): ICD-10-CM

## 2023-10-12 DIAGNOSIS — E03.9 ACQUIRED HYPOTHYROIDISM: Primary | ICD-10-CM

## 2023-10-12 PROCEDURE — G0008 ADMIN INFLUENZA VIRUS VAC: HCPCS

## 2023-10-12 PROCEDURE — G0439 PPPS, SUBSEQ VISIT: HCPCS | Performed by: INTERNAL MEDICINE

## 2023-10-12 PROCEDURE — 99214 OFFICE O/P EST MOD 30 MIN: CPT | Performed by: INTERNAL MEDICINE

## 2023-10-12 PROCEDURE — 90662 IIV NO PRSV INCREASED AG IM: CPT

## 2023-10-12 RX ORDER — LEVOTHYROXINE SODIUM 0.1 MG/1
100 TABLET ORAL DAILY
Qty: 90 TABLET | Refills: 1 | Status: SHIPPED | OUTPATIENT
Start: 2023-10-12 | End: 2024-01-10

## 2023-10-12 NOTE — PROGRESS NOTES
Assessment and Plan:     Problem List Items Addressed This Visit          Endocrine    Hypothyroidism - Primary    Relevant Medications    levothyroxine 100 mcg tablet    Other Relevant Orders    T4, free    TSH, 3rd generation    Type 2 diabetes mellitus with stage 3b chronic kidney disease, without long-term current use of insulin (HCC)       Cardiovascular and Mediastinum    PAF (paroxysmal atrial fibrillation) ([address])       Other    Breast cancer ([address])    History of left-sided carotid endarterectomy     Other Visit Diagnoses       Encounter for immunization        Relevant Orders    influenza vaccine, high-dose, PF 0.7 mL (FLUZONE HIGH-DOSE) (Completed)    Medicare annual wellness visit, subsequent        Encounter for osteoporosis screening in asymptomatic postmenopausal patient        Relevant Orders    DXA bone density spine hip and pelvis    Chronic left-sided low back pain without sciatica              1. Acquired hypothyroidism  levothyroxine 100 mcg tablet    T4, free    TSH, 3rd generation    Decrease levothyroxine to 100 mcg daily and will recheck blood work in 4 months. 2. PAF (paroxysmal atrial fibrillation) ([address])      Follows with cardiology rate is stable and patient is on Eliquis. 3. Encounter for immunization  influenza vaccine, high-dose, PF 0.7 mL (FLUZONE HIGH-DOSE)      4. Type 2 diabetes mellitus with other specified complication, unspecified whether long term insulin use ([address])        5. Malignant neoplasm of right female breast, unspecified estrogen receptor status, unspecified site of breast ([address])        6. Medicare annual wellness visit, subsequent        7. History of left-sided carotid endarterectomy        8. Type 2 diabetes mellitus with stage 3b chronic kidney disease, without long-term current use of insulin ([address])        9. Encounter for osteoporosis screening in asymptomatic postmenopausal patient  DXA bone density spine hip and pelvis      10.  Chronic left-sided low back pain without sciatica           Depression Screening and Follow-up Plan: Patient was screened for depression during today's encounter. They screened negative with a PHQ-2 score of 0. Preventive health issues were discussed with patient, and age appropriate screening tests were ordered as noted in patient's After Visit Summary. Personalized health advice and appropriate referrals for health education or preventive services given if needed, as noted in patient's After Visit Summary. History of Present Illness:     Patient presents for a Medicare Wellness Visit    HPI   Patient Care Team:  Ian Farmer MD as PCP - General     Review of Systems:     Review of Systems   Constitutional:  Negative for appetite change, chills, fatigue and fever. HENT:  Negative for sore throat and trouble swallowing. Eyes:  Negative for redness. Respiratory:  Negative for shortness of breath. Cardiovascular:  Negative for chest pain and palpitations. Gastrointestinal:  Negative for abdominal pain, constipation and diarrhea. Genitourinary:  Negative for dysuria and hematuria. Musculoskeletal:  Positive for gait problem. Negative for back pain and neck pain. Skin:  Negative for rash. Neurological:  Negative for seizures, weakness and headaches. Hematological:  Negative for adenopathy. Psychiatric/Behavioral:  Negative for confusion. The patient is not nervous/anxious.          Problem List:     Patient Active Problem List   Diagnosis    Breast cancer ([address])    Hypothyroidism    Displaced intertrochanteric fracture of left femur, subsequent encounter for closed fracture with routine healing    Type 2 diabetes mellitus with stage 3b chronic kidney disease, without long-term current use of insulin (HCC)    Hypertension    Hyponatremia    Lower extremity edema    Cognitive impairment    Ambulatory dysfunction    Acute on chronic anemia    Goals of care, counseling/discussion    History of left-sided carotid endarterectomy    PAF (paroxysmal atrial fibrillation) (HCC)    History of arthroplasty of right knee      Past Medical and Surgical History:     Past Medical History:   Diagnosis Date    Breast CA ([address])     Diabetes mellitus ([address])     Disease of thyroid gland     Heart attack ([address])     Hypertension     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[address])      Past Surgical History:   Procedure Laterality Date    BREAST LUMPECTOMY      CAROTID ARTERY - SUBCLAVIAN ARTERY BYPASS GRAFT      CARPAL TUNNEL RELEASE      CARPAL TUNNEL RELEASE Bilateral     FEMUR SURGERY      KNEE SURGERY      SHOULDER DEBRIDEMENT      TONSILLECTOMY      TOTAL ABDOMINAL HYSTERECTOMY W/ BILATERAL SALPINGOOPHORECTOMY        Family History:     Family History   Problem Relation Age of Onset    Stroke Mother     Heart attack Father     Heart attack Brother       Social History:     Social History     Socioeconomic History    Marital status: /Civil Union     Spouse name: None    Number of children: None    Years of education: None    Highest education level: None   Occupational History    None   Tobacco Use    Smoking status: Never    Smokeless tobacco: Never   Vaping Use    Vaping Use: Never used   Substance and Sexual Activity    Alcohol use: Yes     Comment: social    Drug use: Yes     Types: Marijuana     Comment: medical marijuana card    Sexual activity: None   Other Topics Concern    None   Social History Narrative    None     Social Determinants of Health     Financial Resource Strain: Low Risk  (10/12/2023)    Overall Financial Resource Strain (CARDIA)     Difficulty of Paying Living Expenses: Not hard at all   Food Insecurity: Not on file   Transportation Needs: No Transportation Needs (10/12/2023)    PRAPARE - Transportation     Lack of Transportation (Medical): No     Lack of Transportation (Non-Medical):  No   Physical Activity: Not on file   Stress: Not on file   Social Connections: Not on file   Intimate Partner Violence: Not on file   Housing Stability: Not on file      Medications and Allergies:     Current Outpatient Medications   Medication Sig Dispense Refill    ALPRAZolam (XANAX) 0.25 mg tablet Take 0.25 mg by mouth daily as needed for anxiety      Cholecalciferol 50 MCG (2000 UT) CAPS Take 1 capsule by mouth daily      cyanocobalamin (VITAMIN B-12) 1000 MCG tablet Take 1,000 mcg by mouth daily      Eliquis 5 MG Take 5 mg by mouth 2 (two) times a day      Fe Asp Gly-Fe Polysac-Suc Ac-C (FERREX 150 PLUS PO) Take by mouth in the morning      ferrous sulfate 324 MG TBEC Take 324 mg by mouth daily with breakfast      furosemide (LASIX) 20 mg tablet TAKE 1 TABLET BY MOUTH EVERY DAY 90 tablet 1    glucose blood (OneTouch Verio) test strip daily      hydrochlorothiazide (HYDRODIURIL) 12.5 mg tablet Take 12.5 mg by mouth if needed (edema)      Lancets 33G MISC Use 1 each daily      levothyroxine 100 mcg tablet Take 1 tablet (100 mcg total) by mouth daily Fri, Sat, Sun 90 tablet 1    Lidocaine 4 % PTCH Apply 1 patch topically daily      lisinopril (ZESTRIL) 10 mg tablet Take 10 mg by mouth daily      losartan (COZAAR) 25 mg tablet Take 25 mg by mouth daily      metFORMIN (GLUCOPHAGE) 500 mg tablet Take 500 mg by mouth      metoprolol succinate (TOPROL-XL) 25 mg 24 hr tablet Take 1 tablet (25 mg total) by mouth daily      oxybutynin (DITROPAN-XL) 10 MG 24 hr tablet Take 10 mg by mouth every morning      rosuvastatin (CRESTOR) 20 MG tablet Take 20 mg by mouth every evening      traMADol (ULTRAM) 50 mg tablet every 6 (six) hours as needed      aspirin (ECOTRIN LOW STRENGTH) 81 mg EC tablet Take 81 mg by mouth daily (Patient not taking: Reported on 8/30/2023)      carvedilol (COREG) 12.5 mg tablet Take 12.5 mg by mouth 2 (two) times a day (Patient not taking: Reported on 10/12/2023)      glucose blood test strip  ONE ONE TIME DAILY  (Patient not taking: Reported on 8/30/2023)      HYDROcodone-acetaminophen (NORCO) 5-325 mg per tablet Take 1 tablet by mouth every 6 (six) hours as needed for pain for up to 10 dosesMax Daily Amount: 4 tablets (Patient not taking: Reported on 8/30/2023) 10 tablet 0    lidocaine (LIDODERM) 5 % PLACE 1 PATCH ON THE SKIN EVERY 12 HOURS. REMOVE & DISCARD PATCH WITHIN 12 HOURS OR AS DIRECTED BY MD (Patient not taking: Reported on 8/30/2023)      metFORMIN (GLUCOPHAGE-XR) 500 mg 24 hr tablet  (Patient not taking: Reported on 8/30/2023)      rosuvastatin (CRESTOR) 10 MG tablet Take 10 mg by mouth (Patient not taking: Reported on 8/30/2023)      sodium chloride 1 g tablet Take 2 g by mouth 2 (two) times a day (Patient not taking: Reported on 8/30/2023)       No current facility-administered medications for this visit. No Known Allergies   Immunizations:     Immunization History   Administered Date(s) Administered    COVID-19 PFIZER VACCINE 0.3 ML IM 02/23/2021, 03/16/2021, 09/27/2021, 10/17/2022    COVID-19 Pfizer Vac BIVALENT Juan Jose-sucrose 12 Yr+ IM 01/27/2023    COVID-19 Pfizer vac (Juan Jose-sucrose, gray cap) 12 yr+ IM 04/18/2022    INFLUENZA 09/12/2013, 10/07/2014, 09/22/2015, 09/19/2016, 10/04/2022    Influenza Split High Dose Preservative Free IM 10/13/2019, 10/13/2020    Influenza, high dose seasonal 0.7 mL 10/12/2023    Pneumococcal Conjugate Vaccine 20-valent (Pcv20), Polysace 02/01/2023    Zoster 12/04/2014      Health Maintenance: There are no preventive care reminders to display for this patient. Topic Date Due    COVID-19 Vaccine (7 - Pfizer series) 05/27/2023      Medicare Screening Tests and Risk Assessments:     [facility] IV[facility] is here for her Initial Wellness visit. Last Medicare Wellness visit information reviewed, patient interviewed and updates made to the record today.       Depression Screening:   PHQ-2 Score: 0      PREVENTIVE SCREENINGS      Cardiovascular Screening:    General: Screening Not Indicated and History Lipid Disorder      Diabetes Screening:     General: Screening Not Indicated and History Diabetes      Breast Cancer Screening:     General: History Breast Cancer      Cervical Cancer Screening:    General: Screening Not Indicated      Lung Cancer Screening:     General: Screening Not Indicated    Other Counseling Topics:   Car/seat belt/driving safety, skin self-exam, sunscreen and regular weightbearing exercise and calcium and vitamin D intake. No results found. Physical Exam:     /80 (BP Location: Left arm, Patient Position: Sitting, Cuff Size: Standard)   Pulse 97   Temp 98 °F (36.7 °C) (Temporal)   Ht 4' 11" (1.499 m)   Wt 58.5 kg (129 lb)   SpO2 97%   BMI 26.05 kg/m²     Physical Exam  Vitals and nursing note reviewed. Constitutional:       General: She is not in acute distress. Appearance: She is well-developed. HENT:      Head: Normocephalic and atraumatic. Mouth/Throat:      Mouth: Mucous membranes are moist.   Eyes:      Conjunctiva/sclera: Conjunctivae normal.   Cardiovascular:      Rate and Rhythm: Normal rate. Rhythm irregular. Heart sounds: No murmur heard. Pulmonary:      Effort: Pulmonary effort is normal. No respiratory distress. Breath sounds: Normal breath sounds. Abdominal:      General: Abdomen is flat. Palpations: Abdomen is soft. Tenderness: There is no abdominal tenderness. Musculoskeletal:         General: No swelling. Cervical back: Normal range of motion and neck supple. Comments: Left leg is shorter has shoe lift   Skin:     General: Skin is warm and dry. Capillary Refill: Capillary refill takes less than 2 seconds. Neurological:      Mental Status: She is alert.       Gait: Gait abnormal.   Psychiatric:         Mood and Affect: Mood normal.                Kathleen Spain MD

## 2023-10-12 NOTE — PROGRESS NOTES
Assessment and Plan:     Problem List Items Addressed This Visit    None       Preventive health issues were discussed with patient, and age appropriate screening tests were ordered as noted in patient's After Visit Summary. Personalized health advice and appropriate referrals for health education or preventive services given if needed, as noted in patient's After Visit Summary.      History of Present Illness:     Patient presents for a Medicare Wellness Visit    HPI   Patient Care Team:  Farnaz France MD as PCP - General     Review of Systems:     Review of Systems     Problem List:     Patient Active Problem List   Diagnosis    Breast cancer [facility])    Hypothyroidism    Displaced intertrochanteric fracture of left femur, subsequent encounter for closed fracture with routine healing    Diabetes mellitus ([address])    Hypertension    Hyponatremia    Lower extremity edema    Cognitive impairment    Ambulatory dysfunction    Acute on chronic anemia    Goals of care, counseling/discussion    History of left-sided carotid endarterectomy    PAF (paroxysmal atrial fibrillation) ([facility])    History of arthroplasty of right knee      Past Medical and Surgical History:     Past Medical History:   Diagnosis Date    Breast CA ([address])     Diabetes mellitus ([address])     Disease of thyroid gland     Heart attack ([address])     Hypertension     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[address])      Past Surgical History:   Procedure Laterality Date    BREAST LUMPECTOMY      CAROTID ARTERY - SUBCLAVIAN ARTERY BYPASS GRAFT      CARPAL TUNNEL RELEASE      CARPAL TUNNEL RELEASE Bilateral     FEMUR SURGERY      KNEE SURGERY      SHOULDER DEBRIDEMENT      TONSILLECTOMY      TOTAL ABDOMINAL HYSTERECTOMY W/ BILATERAL SALPINGOOPHORECTOMY        Family History:     Family History   Problem Relation Age of Onset    Stroke Mother     Heart attack Father     Heart attack Brother       Social History:     Social History     Socioeconomic History    Marital status: /Civil Union Spouse name: Not on file    Number of children: Not on file    Years of education: Not on file    Highest education level: Not on file   Occupational History    Not on file   Tobacco Use    Smoking status: Never    Smokeless tobacco: Never   Vaping Use    Vaping Use: Never used   Substance and Sexual Activity    Alcohol use: Yes     Comment: social    Drug use: Never    Sexual activity: Not on file   Other Topics Concern    Not on file   Social History Narrative    Not on file     Social Determinants of Health     Financial Resource Strain: Not on file   Food Insecurity: Not on file   Transportation Needs: Not on file   Physical Activity: Not on file   Stress: Not on file   Social Connections: Not on file   Intimate Partner Violence: Not on file   Housing Stability: Not on file      Medications and Allergies:     Current Outpatient Medications   Medication Sig Dispense Refill    ALPRAZolam (XANAX) 0.25 mg tablet Take 0.25 mg by mouth daily as needed for anxiety      aspirin (ECOTRIN LOW STRENGTH) 81 mg EC tablet Take 81 mg by mouth daily (Patient not taking: Reported on 8/30/2023)      carvedilol (COREG) 12.5 mg tablet Take 12.5 mg by mouth 2 (two) times a day      Cholecalciferol 50 MCG (2000 UT) CAPS Take 1 capsule by mouth daily      cyanocobalamin (VITAMIN B-12) 1000 MCG tablet Take 1,000 mcg by mouth daily      Eliquis 5 MG Take 5 mg by mouth 2 (two) times a day      Fe Asp Gly-Fe Polysac-Suc Ac-C (FERREX 150 PLUS PO) Take by mouth in the morning      ferrous sulfate 324 MG TBEC Take 324 mg by mouth daily with breakfast      furosemide (LASIX) 20 mg tablet TAKE 1 TABLET BY MOUTH EVERY DAY 90 tablet 1    glucose blood (OneTouch Verio) test strip daily      glucose blood test strip  ONE ONE TIME DAILY  (Patient not taking: Reported on 8/30/2023)      hydrochlorothiazide (HYDRODIURIL) 12.5 mg tablet Take 12.5 mg by mouth if needed (edema)      HYDROcodone-acetaminophen (NORCO) 5-325 mg per tablet Take 1 tablet by mouth every 6 (six) hours as needed for pain for up to 10 dosesMax Daily Amount: 4 tablets (Patient not taking: Reported on 8/30/2023) 10 tablet 0    Lancets 33G MISC Use 1 each daily      levothyroxine 100 mcg tablet 100 mcg Fri, Sat, Sun      levothyroxine 112 mcg tablet Take 112 mcg by mouth Mon, Tues, Wed, Thurs      lidocaine (LIDODERM) 5 % PLACE 1 PATCH ON THE SKIN EVERY 12 HOURS. REMOVE & DISCARD PATCH WITHIN 12 HOURS OR AS DIRECTED BY MD (Patient not taking: Reported on 8/30/2023)      Lidocaine 4 % PTCH Apply 1 patch topically daily      lisinopril (ZESTRIL) 10 mg tablet Take 10 mg by mouth daily      losartan (COZAAR) 25 mg tablet Take 25 mg by mouth daily      metFORMIN (GLUCOPHAGE) 500 mg tablet Take 500 mg by mouth      metFORMIN (GLUCOPHAGE-XR) 500 mg 24 hr tablet  (Patient not taking: Reported on 8/30/2023)      metoprolol succinate (TOPROL-XL) 25 mg 24 hr tablet Take 1 tablet (25 mg total) by mouth daily      oxybutynin (DITROPAN-XL) 10 MG 24 hr tablet Take 10 mg by mouth every morning      rosuvastatin (CRESTOR) 10 MG tablet Take 10 mg by mouth (Patient not taking: Reported on 8/30/2023)      rosuvastatin (CRESTOR) 20 MG tablet Take 20 mg by mouth every evening      sodium chloride 1 g tablet Take 2 g by mouth 2 (two) times a day (Patient not taking: Reported on 8/30/2023)      traMADol (ULTRAM) 50 mg tablet  (Patient not taking: Reported on 8/30/2023)       No current facility-administered medications for this visit.      No Known Allergies   Immunizations:     Immunization History   Administered Date(s) Administered    COVID-19 PFIZER VACCINE 0.3 ML IM 02/23/2021, 03/16/2021, 09/27/2021, 10/17/2022    COVID-19 Pfizer Vac BIVALENT Juan Jose-sucrose 12 Yr+ IM 01/27/2023    COVID-19 Pfizer vac (Juan Jose-sucrose, gray cap) 12 yr+ IM 04/18/2022    INFLUENZA 09/12/2013, 10/07/2014, 09/22/2015, 09/19/2016, 10/04/2022    Influenza Split High Dose Preservative Free IM 10/13/2019, 10/13/2020    Pneumococcal Conjugate Vaccine 20-valent (Pcv20), Polysace 02/01/2023    Zoster 12/04/2014      Health Maintenance: There are no preventive care reminders to display for this patient. Topic Date Due    COVID-19 Vaccine (7 - Pfizer series) 05/27/2023    Influenza Vaccine (1) 09/01/2023      Medicare Screening Tests and Risk Assessments:     Jaison Mercado is here for her Initial Wellness visit. Last Medicare Wellness visit information reviewed, patient interviewed and updates made to the record today. Health Risk Assessment:   Patient rates overall health as good. Patient feels that their physical health rating is slightly better. Patient is very satisfied with their life. Eyesight was rated as same. Hearing was rated as same. Patient feels that their emotional and mental health rating is much better. Patients states they are never, rarely angry. Patient states they are never, rarely unusually tired/fatigued. Pain experienced in the last 7 days has been a lot. Patient's pain rating has been 9/10. Patient states that she has experienced no weight loss or gain in last 6 months. Depression Screening:   PHQ-2 Score: 0      Fall Risk Screening: In the past year, patient has experienced: no history of falling in past year      Urinary Incontinence Screening:   Patient has not leaked urine accidently in the last six months. Home Safety:  Patient does not have trouble with stairs inside or outside of their home. Patient has working smoke alarms and has working carbon monoxide detector. Home safety hazards include: none. Nutrition:   Current diet is Regular. Medications:   Patient is currently taking over-the-counter supplements. OTC medications include: see medication list. Patient is able to manage medications.      Activities of Daily Living (ADLs)/Instrumental Activities of Daily Living (IADLs):   Walk and transfer into and out of bed and chair?: Yes  Dress and groom yourself?: Yes    Bathe or shower yourself?: Yes    Feed yourself? Yes  Do your laundry/housekeeping?: Yes  Manage your money, pay your bills and track your expenses?: Yes  Make your own meals?: Yes    Do your own shopping?: Yes    Previous Hospitalizations:   Any hospitalizations or ED visits within the last 12 months?: Yes    How many hospitalizations have you had in the last year?: 1-2    Advance Care Planning:   Living will: No    Durable POA for healthcare: Yes    Advanced directive: No      PREVENTIVE SCREENINGS      Cardiovascular Screening:    General: Screening Not Indicated and History Lipid Disorder      Diabetes Screening:     General: Screening Not Indicated and History Diabetes      Breast Cancer Screening:     General: History Breast Cancer      Cervical Cancer Screening:    General: Screening Not Indicated      Lung Cancer Screening:     General: Screening Not Indicated    Screening, Brief Intervention, and Referral to Treatment (SBIRT)    Screening  Typical number of drinks in a day: 0  Typical number of drinks in a week: 0  Interpretation: Low risk drinking behavior. Single Item Drug Screening:  How often have you used an illegal drug (including marijuana) or a prescription medication for non-medical reasons in the past year? never    Single Item Drug Screen Score: 0  Interpretation: Negative screen for possible drug use disorder    No results found. Physical Exam:     There were no vitals taken for this visit.     Physical Exam     Reny Garcia MD

## 2023-11-08 ENCOUNTER — TELEPHONE (OUTPATIENT)
Dept: INTERNAL MEDICINE CLINIC | Facility: OTHER | Age: 83
End: 2023-11-08

## 2023-11-08 NOTE — TELEPHONE ENCOUNTER
Patient had Eye Exam in Oct 2023 at [facility] but the verbiage on the report did not include whether the patient's eyes were dilated. Sent Eye Exam Form to Kristy Henry via fax.

## 2023-11-14 ENCOUNTER — TELEPHONE (OUTPATIENT)
Dept: INTERNAL MEDICINE CLINIC | Facility: CLINIC | Age: 83
End: 2023-11-14

## 2023-11-14 DIAGNOSIS — N30.00 ACUTE CYSTITIS WITHOUT HEMATURIA: Primary | ICD-10-CM

## 2023-11-14 RX ORDER — CEPHALEXIN 500 MG/1
500 CAPSULE ORAL EVERY 8 HOURS SCHEDULED
Qty: 9 CAPSULE | Refills: 0 | Status: SHIPPED | OUTPATIENT
Start: 2023-11-14 | End: 2023-11-17

## 2023-11-14 NOTE — TELEPHONE ENCOUNTER
Spoke to her she is out of town but does not want me to send the prescription there. I told her that the prescription was sent locally to her pharmacy.

## 2023-11-14 NOTE — TELEPHONE ENCOUNTER
Called patient and she is away and can't complete the testing until next week but would like to start the antibioitic. What should she do. Thank you!

## 2023-11-14 NOTE — TELEPHONE ENCOUNTER
Patient called and requested an antiobiotic for a Uti, she is having symptoms of a Uti, she has a burning sensation after she uses the bathroom. Patient states she gets them all the time.     She would like it sent to [facility] on [address]

## 2023-11-15 ENCOUNTER — TELEPHONE (OUTPATIENT)
Dept: INTERNAL MEDICINE CLINIC | Facility: OTHER | Age: 83
End: 2023-11-15

## 2023-11-20 ENCOUNTER — TELEPHONE (OUTPATIENT)
Dept: INTERNAL MEDICINE CLINIC | Facility: CLINIC | Age: 83
End: 2023-11-20

## 2023-11-20 NOTE — TELEPHONE ENCOUNTER
I left a message for patient asking her to confirm how she takes her Levothyroxine (either 1 tablet by mouth Daily OR 1 tablet by mouth 3 times weekly on Fri, Sat and Sun -- no tablets Mon-Thurs). Dr Huong Mcgrath received Medical Clarification Request form from [place]. Paper is on Dr Janee Andres desk at [address]

## 2023-11-30 ENCOUNTER — TELEPHONE (OUTPATIENT)
Dept: INTERNAL MEDICINE CLINIC | Facility: CLINIC | Age: 83
End: 2023-11-30

## 2023-12-04 ENCOUNTER — TELEPHONE (OUTPATIENT)
Dept: INTERNAL MEDICINE CLINIC | Facility: CLINIC | Age: 83
End: 2023-12-04

## 2023-12-04 NOTE — TELEPHONE ENCOUNTER
She has finished the medicine for the UTI but feels that the symptoms are back, does she need another course of the medicine or should she have another urine culture

## 2023-12-05 NOTE — TELEPHONE ENCOUNTER
Called patient to make appointment [facility] she is feeling better just wanted to [place] to know

## 2023-12-15 DIAGNOSIS — E03.9 ACQUIRED HYPOTHYROIDISM: ICD-10-CM

## 2023-12-15 RX ORDER — LEVOTHYROXINE SODIUM 0.1 MG/1
100 TABLET ORAL DAILY
Qty: 90 TABLET | Refills: 0 | Status: SHIPPED | OUTPATIENT
Start: 2023-12-15 | End: 2024-03-14

## 2023-12-15 NOTE — TELEPHONE ENCOUNTER
Patient is having difficulties getting her Levothyroxine from [facility].     I called [facility] and they do not have a trace of this medication being sent through in October. The last itme it was filled on their end was back in August.     Please send order for Levothyroxine to [facility] and also clarify instructions on how patient should be taking medication.

## 2023-12-16 ENCOUNTER — TELEPHONE (OUTPATIENT)
Dept: OTHER | Facility: OTHER | Age: 83
End: 2023-12-16

## 2023-12-22 ENCOUNTER — TELEPHONE (OUTPATIENT)
Dept: INTERNAL MEDICINE CLINIC | Facility: CLINIC | Age: 83
End: 2023-12-22

## 2023-12-22 NOTE — TELEPHONE ENCOUNTER
Patient called and left a message stating she is  in the hospital and they are giving her some antibiotics and she thinks she will be home tomorrow she just wanted you to know

## 2023-12-26 ENCOUNTER — TRANSITIONAL CARE MANAGEMENT (OUTPATIENT)
Dept: INTERNAL MEDICINE CLINIC | Facility: OTHER | Age: 83
End: 2023-12-26

## 2023-12-26 ENCOUNTER — TELEPHONE (OUTPATIENT)
Dept: INTERNAL MEDICINE CLINIC | Facility: OTHER | Age: 83
End: 2023-12-26

## 2023-12-26 PROBLEM — K11.20 PAROTITIS: Status: ACTIVE | Noted: 2023-12-26

## 2024-01-16 DIAGNOSIS — E11.69 TYPE 2 DIABETES MELLITUS WITH OTHER SPECIFIED COMPLICATION, UNSPECIFIED WHETHER LONG TERM INSULIN USE (HCC): Primary | ICD-10-CM

## 2024-01-16 RX ORDER — LANCETS 33 GAUGE
1 EACH MISCELLANEOUS DAILY
Qty: 100 EACH | Refills: 3 | Status: SHIPPED | OUTPATIENT
Start: 2024-01-16

## 2024-02-08 ENCOUNTER — TELEPHONE (OUTPATIENT)
Dept: INTERNAL MEDICINE CLINIC | Facility: CLINIC | Age: 84
End: 2024-02-08

## 2024-02-08 NOTE — TELEPHONE ENCOUNTER
Patient called and left message on machine to call patient back, when I called back no answer so I left message on machine to call the office back

## 2024-02-09 DIAGNOSIS — E03.9 ACQUIRED HYPOTHYROIDISM: ICD-10-CM

## 2024-02-09 RX ORDER — LEVOTHYROXINE SODIUM 0.1 MG/1
100 TABLET ORAL DAILY
Qty: 90 TABLET | Refills: 0 | Status: SHIPPED | OUTPATIENT
Start: 2024-02-09 | End: 2024-05-09

## 2024-02-26 ENCOUNTER — OFFICE VISIT (OUTPATIENT)
Dept: INTERNAL MEDICINE CLINIC | Facility: CLINIC | Age: 84
End: 2024-02-26
Payer: MEDICARE

## 2024-02-26 VITALS
WEIGHT: 125 LBS | BODY MASS INDEX: 25.2 KG/M2 | TEMPERATURE: 97.8 F | HEIGHT: 59 IN | HEART RATE: 80 BPM | DIASTOLIC BLOOD PRESSURE: 74 MMHG | SYSTOLIC BLOOD PRESSURE: 130 MMHG | OXYGEN SATURATION: 98 %

## 2024-02-26 DIAGNOSIS — I10 PRIMARY HYPERTENSION: ICD-10-CM

## 2024-02-26 DIAGNOSIS — I48.0 PAF (PAROXYSMAL ATRIAL FIBRILLATION) (HCC): ICD-10-CM

## 2024-02-26 DIAGNOSIS — N18.32 TYPE 2 DIABETES MELLITUS WITH STAGE 3B CHRONIC KIDNEY DISEASE, WITHOUT LONG-TERM CURRENT USE OF INSULIN (HCC): ICD-10-CM

## 2024-02-26 DIAGNOSIS — C50.911 MALIGNANT NEOPLASM OF RIGHT FEMALE BREAST, UNSPECIFIED ESTROGEN RECEPTOR STATUS, UNSPECIFIED SITE OF BREAST (HCC): ICD-10-CM

## 2024-02-26 DIAGNOSIS — E78.2 MIXED HYPERLIPIDEMIA: ICD-10-CM

## 2024-02-26 DIAGNOSIS — E03.9 ACQUIRED HYPOTHYROIDISM: Primary | ICD-10-CM

## 2024-02-26 DIAGNOSIS — E11.22 TYPE 2 DIABETES MELLITUS WITH STAGE 3B CHRONIC KIDNEY DISEASE, WITHOUT LONG-TERM CURRENT USE OF INSULIN (HCC): ICD-10-CM

## 2024-02-26 DIAGNOSIS — R41.89 COGNITIVE IMPAIRMENT: ICD-10-CM

## 2024-02-26 DIAGNOSIS — I77.9 LEFT-SIDED CAROTID ARTERY DISEASE, UNSPECIFIED TYPE (HCC): ICD-10-CM

## 2024-02-26 DIAGNOSIS — E11.69 TYPE 2 DIABETES MELLITUS WITH OTHER SPECIFIED COMPLICATION, UNSPECIFIED WHETHER LONG TERM INSULIN USE (HCC): ICD-10-CM

## 2024-02-26 PROCEDURE — 99214 OFFICE O/P EST MOD 30 MIN: CPT | Performed by: INTERNAL MEDICINE

## 2024-02-26 RX ORDER — ROSUVASTATIN CALCIUM 20 MG/1
20 TABLET, COATED ORAL EVERY EVENING
Start: 2024-02-26

## 2024-02-26 RX ORDER — MULTIVITAMIN WITH IRON
250 TABLET ORAL
COMMUNITY

## 2024-02-26 RX ORDER — LOSARTAN POTASSIUM 25 MG/1
50 TABLET ORAL DAILY
Start: 2024-02-26

## 2024-02-26 NOTE — PROGRESS NOTES
Assessment/Plan:           1. Acquired hypothyroidism  Comments:  Labs were ordered previous TSH was low patient decreased dose to 100 mcg in October.  Orders:  -     T4, free; Future  -     TSH, 3rd generation; Future  -     CBC and differential; Future  -     Comprehensive metabolic panel; Future  -     Urinalysis with microscopic; Future    2. Type 2 diabetes mellitus with other specified complication, unspecified whether long term insulin use ([facility])  Comments:  Under good control continue diabetic diet and metformin.  Orders:  -     metFORMIN (GLUCOPHAGE) 500 mg tablet; Take 1 tablet (500 mg total) by mouth daily with breakfast  -     Albumin / creatinine urine ratio; Future  -     Hemoglobin A1C; Future    3. PAF (paroxysmal atrial fibrillation) ([facility])  Comments:  Following with cardiology and rate is controlled and is on anticoagulation.    4. Primary hypertension  -     losartan (COZAAR) 25 mg tablet; Take 2 tablets (50 mg total) by mouth daily    5. Left-sided carotid artery disease, unspecified type ([facility])    6. Malignant neoplasm of right female breast, unspecified estrogen receptor status, unspecified site of breast ([facility])    7. Type 2 diabetes mellitus with stage 3b chronic kidney disease, without long-term current use of insulin ([facility])    8. Mixed hyperlipidemia  -     rosuvastatin (CRESTOR) 20 MG tablet; Take 1 tablet (20 mg total) by mouth every evening    9. Cognitive impairment           1. Acquired hypothyroidism    - T4, free; Future  - TSH, 3rd generation; Future  - CBC and differential; Future  - Comprehensive metabolic panel; Future  - Urinalysis with microscopic; Future    2. Type 2 diabetes mellitus with other specified complication, unspecified whether long term insulin use (HCC)    - metFORMIN (GLUCOPHAGE) 500 mg tablet; Take 1 tablet (500 mg total) by mouth daily with breakfast    3. PAF (paroxysmal atrial fibrillation) ([facility])      4. Primary hypertension    - losartan (COZAAR) 25 mg tablet;  Take 2 tablets (50 mg total) by mouth daily    5. Left-sided carotid artery disease, unspecified type ([facility])      6. Malignant neoplasm of right female breast, unspecified estrogen receptor status, unspecified site of breast ([facility])      7. Type 2 diabetes mellitus with stage 3b chronic kidney disease, without long-term current use of insulin ([facility])      8. Mixed hyperlipidemia    - rosuvastatin (CRESTOR) 20 MG tablet; Take 1 tablet (20 mg total) by mouth every evening       No problem-specific Assessment & Plan notes found for this encounter.           Subjective:      Patient ID: Juliana Tucker is a 83 y.o. female.    HPI    The following portions of the patient's history were reviewed and updated as appropriate: She  has a past medical history of Arthritis, Breast CA ([facility]), Diabetes mellitus ([facility]), Disease of thyroid gland, Heart attack ([facility]), Hypertension, and 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[facility]).  She   Patient Active Problem List    Diagnosis Date Noted    Left-sided carotid artery disease, unspecified type ([facility]) 02/26/2024    Parotitis 12/26/2023    Iron deficiency anemia 10/12/2023    History of left-sided carotid endarterectomy 08/30/2023    PAF (paroxysmal atrial fibrillation) ([facility]) 08/30/2023    History of arthroplasty of right knee 08/30/2023    Displaced intertrochanteric fracture of left femur, subsequent encounter for closed fracture with routine healing 04/03/2020    Hypertension 04/03/2020    Hyponatremia 04/03/2020    Lower extremity edema 04/03/2020    Cognitive impairment 04/03/2020    Ambulatory dysfunction 04/03/2020    Acute on chronic anemia 04/03/2020    Goals of care, counseling/discussion 04/03/2020    Type 2 diabetes mellitus with stage 3b chronic kidney disease, without long-term current use of insulin ([facility])     Breast cancer ([facility]) 11/16/2017    Hypothyroidism 11/16/2017     She  has a past surgical history that includes Tonsillectomy; Carpal tunnel release; Shoulder Debridement; Breast lumpectomy;  Carotid artery - subclavian artery bypass graft; Knee surgery; Femur Surgery; Total abdominal hysterectomy w/ bilateral salpingoophorectomy; and Carpal tunnel release (Bilateral).  Her family history includes Arthritis in her family; Cancer in her family; Heart attack in her brother and father; Heart disease in her family; Hypertension in her family; Stroke in her mother.  She  reports that she has never smoked. She has never used smokeless tobacco. She reports current alcohol use. She reports that she does not currently use drugs after having used the following drugs: Marijuana.  Current Outpatient Medications   Medication Sig Dispense Refill    ALPRAZolam (XANAX) 0.25 mg tablet Take 0.25 mg by mouth daily as needed for anxiety      Cholecalciferol 50 MCG (2000 UT) CAPS Take 1 capsule by mouth daily      cyanocobalamin (VITAMIN B-12) 1000 MCG tablet Take 1,000 mcg by mouth daily      Eliquis 5 MG Take 5 mg by mouth 2 (two) times a day      Fe Asp Gly-Fe Polysac-Suc Ac-C (FERREX 150 PLUS PO) Take by mouth in the morning      ferrous sulfate 324 MG TBEC Take 324 mg by mouth daily with breakfast      glucose blood (OneTouch Verio) test strip daily      Lancets 33G MISC Use 1 each daily 100 each 3    levothyroxine 100 mcg tablet Take 1 tablet (100 mcg total) by mouth daily 90 tablet 0    Lidocaine 4 % PTCH Apply 1 patch topically daily      losartan (COZAAR) 25 mg tablet Take 2 tablets (50 mg total) by mouth daily      Magnesium 250 MG TABS Take 250 mg by mouth daily after breakfast      metFORMIN (GLUCOPHAGE) 500 mg tablet Take 1 tablet (500 mg total) by mouth daily with breakfast      metoprolol succinate (TOPROL-XL) 25 mg 24 hr tablet Take 1 tablet (25 mg total) by mouth daily      oxybutynin (DITROPAN-XL) 10 MG 24 hr tablet Take 10 mg by mouth every morning      rosuvastatin (CRESTOR) 20 MG tablet Take 1 tablet (20 mg total) by mouth every evening      traMADol (ULTRAM) 50 mg tablet every 6 (six) hours as needed       glucose blood test strip  ONE ONE TIME DAILY  (Patient not taking: Reported on 8/30/2023)       No current facility-administered medications for this visit.     Current Outpatient Medications on File Prior to Visit   Medication Sig    ALPRAZolam (XANAX) 0.25 mg tablet Take 0.25 mg by mouth daily as needed for anxiety    Cholecalciferol 50 MCG (2000 UT) CAPS Take 1 capsule by mouth daily    cyanocobalamin (VITAMIN B-12) 1000 MCG tablet Take 1,000 mcg by mouth daily    Eliquis 5 MG Take 5 mg by mouth 2 (two) times a day    Fe Asp Gly-Fe Polysac-Suc Ac-C (FERREX 150 PLUS PO) Take by mouth in the morning    ferrous sulfate 324 MG TBEC Take 324 mg by mouth daily with breakfast    glucose blood (OneTouch Verio) test strip daily    Lancets 33G MISC Use 1 each daily    levothyroxine 100 mcg tablet Take 1 tablet (100 mcg total) by mouth daily    Lidocaine 4 % PTCH Apply 1 patch topically daily    Magnesium 250 MG TABS Take 250 mg by mouth daily after breakfast    metoprolol succinate (TOPROL-XL) 25 mg 24 hr tablet Take 1 tablet (25 mg total) by mouth daily    oxybutynin (DITROPAN-XL) 10 MG 24 hr tablet Take 10 mg by mouth every morning    traMADol (ULTRAM) 50 mg tablet every 6 (six) hours as needed    [DISCONTINUED] losartan (COZAAR) 25 mg tablet Take 25 mg by mouth daily    [DISCONTINUED] metFORMIN (GLUCOPHAGE) 500 mg tablet Take 500 mg by mouth    [DISCONTINUED] rosuvastatin (CRESTOR) 20 MG tablet Take 20 mg by mouth every evening    glucose blood test strip  ONE ONE TIME DAILY  (Patient not taking: Reported on 8/30/2023)    [DISCONTINUED] aspirin (ECOTRIN LOW STRENGTH) 81 mg EC tablet Take 81 mg by mouth daily (Patient not taking: Reported on 8/30/2023)    [DISCONTINUED] carvedilol (COREG) 12.5 mg tablet Take 12.5 mg by mouth 2 (two) times a day (Patient not taking: Reported on 10/12/2023)    [DISCONTINUED] furosemide (LASIX) 20 mg tablet TAKE 1 TABLET BY MOUTH EVERY DAY (Patient not taking: Reported on 2/26/2024)     [DISCONTINUED] hydrochlorothiazide (HYDRODIURIL) 12.5 mg tablet Take 12.5 mg by mouth if needed (edema) (Patient not taking: Reported on 2/26/2024)    [DISCONTINUED] HYDROcodone-acetaminophen (NORCO) 5-325 mg per tablet Take 1 tablet by mouth every 6 (six) hours as needed for pain for up to 10 dosesMax Daily Amount: 4 tablets (Patient not taking: Reported on 8/30/2023)    [DISCONTINUED] lidocaine (LIDODERM) 5 % PLACE 1 PATCH ON THE SKIN EVERY 12 HOURS. REMOVE & DISCARD PATCH WITHIN 12 HOURS OR AS DIRECTED BY MD (Patient not taking: Reported on 8/30/2023)    [DISCONTINUED] lisinopril (ZESTRIL) 10 mg tablet Take 10 mg by mouth daily (Patient not taking: Reported on 2/26/2024)    [DISCONTINUED] metFORMIN (GLUCOPHAGE-XR) 500 mg 24 hr tablet  (Patient not taking: Reported on 8/30/2023)    [DISCONTINUED] rosuvastatin (CRESTOR) 10 MG tablet Take 10 mg by mouth (Patient not taking: Reported on 2/26/2024)    [DISCONTINUED] sodium chloride 1 g tablet Take 2 g by mouth 2 (two) times a day (Patient not taking: Reported on 8/30/2023)     No current facility-administered medications on file prior to visit.     Medications Discontinued During This Encounter   Medication Reason    aspirin (ECOTRIN LOW STRENGTH) 81 mg EC tablet     furosemide (LASIX) 20 mg tablet     hydrochlorothiazide (HYDRODIURIL) 12.5 mg tablet     HYDROcodone-acetaminophen (NORCO) 5-325 mg per tablet     lidocaine (LIDODERM) 5 %     lisinopril (ZESTRIL) 10 mg tablet     losartan (COZAAR) 25 mg tablet Reorder    metFORMIN (GLUCOPHAGE-XR) 500 mg 24 hr tablet     rosuvastatin (CRESTOR) 10 MG tablet     metFORMIN (GLUCOPHAGE) 500 mg tablet Reorder    rosuvastatin (CRESTOR) 20 MG tablet Reorder    sodium chloride 1 g tablet     carvedilol (COREG) 12.5 mg tablet       She has No Known Allergies..    Review of Systems   Constitutional:  Negative for appetite change, chills, fatigue and fever.   HENT:  Negative for sore throat and trouble swallowing.    Eyes:  Negative  "for redness.   Respiratory:  Negative for shortness of breath.    Cardiovascular:  Negative for chest pain and palpitations.   Gastrointestinal:  Negative for abdominal pain, constipation and diarrhea.   Genitourinary:  Negative for dysuria and hematuria.   Musculoskeletal:  Positive for arthralgias, back pain and gait problem. Negative for neck pain.   Skin:  Negative for rash.   Neurological:  Negative for seizures, weakness and headaches.   Hematological:  Negative for adenopathy.   Psychiatric/Behavioral:  Negative for confusion. The patient is not nervous/anxious.          Objective:      /74 (BP Location: Left arm, Patient Position: Sitting, Cuff Size: Standard)   Pulse 80   Temp 97.8 °F (36.6 °C) (Temporal)   Ht 4' 11\" (1.499 m)   Wt 56.7 kg (125 lb)   SpO2 98%   BMI 25.25 kg/m²     Results Reviewed       None            No results found for this or any previous visit (from the past 1344 hour(s)).     Physical Exam  Constitutional:       Appearance: Normal appearance. She is normal weight.   HENT:      Head: Normocephalic and atraumatic.      Nose: Nose normal.      Mouth/Throat:      Mouth: Mucous membranes are moist.   Eyes:      Extraocular Movements: Extraocular movements intact.      Pupils: Pupils are equal, round, and reactive to light.   Pulmonary:      Effort: Pulmonary effort is normal.   Abdominal:      Palpations: Abdomen is soft.   Musculoskeletal:         General: Normal range of motion.      Cervical back: Normal range of motion and neck supple.      Comments: Left lower limb is shorter.   Neurological:      General: No focal deficit present.      Mental Status: She is alert and oriented to person, place, and time. Mental status is at baseline.      Gait: Gait abnormal.         "

## 2024-03-04 ENCOUNTER — TELEPHONE (OUTPATIENT)
Dept: INTERNAL MEDICINE CLINIC | Facility: CLINIC | Age: 84
End: 2024-03-04

## 2024-03-04 DIAGNOSIS — E03.9 ACQUIRED HYPOTHYROIDISM: Primary | ICD-10-CM

## 2024-03-04 LAB
ALBUMIN SERPL-MCNC: 3.5 G/DL (ref 3.5–5.7)
ALBUMIN/CREAT UR: 16.1
ALP SERPL-CCNC: 75 U/L (ref 35–120)
ALT SERPL-CCNC: 7 U/L
ANION GAP SERPL CALCULATED.3IONS-SCNC: 11 MMOL/L (ref 3–11)
AST SERPL-CCNC: 23 U/L
BASOPHILS # BLD AUTO: 0.1 THOU/CMM (ref 0–0.1)
BASOPHILS NFR BLD AUTO: 1 %
BILIRUB SERPL-MCNC: 0.3 MG/DL (ref 0.2–1)
BUN SERPL-MCNC: 17 MG/DL (ref 7–25)
CALCIUM SERPL-MCNC: 9.5 MG/DL (ref 8.5–10.1)
CAOX CRY #/AREA URNS HPF: PRESENT /[HPF]
CHLORIDE SERPL-SCNC: 97 MMOL/L (ref 100–109)
CO2 SERPL-SCNC: 23 MMOL/L (ref 21–31)
CREAT SERPL-MCNC: 0.86 MG/DL (ref 0.4–1.1)
CREAT UR-MCNC: 105.7 MG/DL (ref 50–200)
CYTOLOGY CMNT CVX/VAG CYTO-IMP: ABNORMAL
DIFFERENTIAL METHOD BLD: ABNORMAL
EOSINOPHIL # BLD AUTO: 0.3 THOU/CMM (ref 0–0.5)
EOSINOPHIL NFR BLD AUTO: 5 %
ERYTHROCYTE [DISTWIDTH] IN BLOOD BY AUTOMATED COUNT: 15.4 % (ref 12–16)
EST. AVERAGE GLUCOSE BLD GHB EST-MCNC: 114 MG/DL
GFR/BSA.PRED SERPLBLD CYS-BASED-ARV: 67 ML/MIN/{1.73_M2}
GLUCOSE SERPL-MCNC: 88 MG/DL (ref 65–99)
GLUCOSE UR QL STRIP: NEGATIVE MG/DL
HBA1C MFR BLD: 5.6 %
HCT VFR BLD AUTO: 29.9 % (ref 35–43)
HGB BLD-MCNC: 9.9 G/DL (ref 11.5–14.5)
HGB UR QL STRIP: NEGATIVE MG/DL
KETONES UR QL STRIP: NEGATIVE MG/DL
LEUKOCYTE ESTERASE UR QL STRIP: ABNORMAL /UL
LYMPHOCYTES # BLD AUTO: 1.1 THOU/CMM (ref 1–3)
LYMPHOCYTES NFR BLD AUTO: 17 %
MCH RBC QN AUTO: 30 PG (ref 26–34)
MCHC RBC AUTO-ENTMCNC: 33.2 G/DL (ref 32–37)
MCV RBC AUTO: 90 FL (ref 80–100)
MICROALBUMIN UR-MCNC: 1.7 MG/DL
MONOCYTES # BLD AUTO: 0.8 THOU/CMM (ref 0.3–1)
MONOCYTES NFR BLD AUTO: 12 %
MUCOUS THREADS URNS QL MICRO: ABNORMAL
NEUTROPHILS # BLD AUTO: 4.5 THOU/CMM (ref 1.8–7.8)
NEUTROPHILS NFR BLD AUTO: 65 %
NITRITE UR QL STRIP: NEGATIVE
PH UR: 5 [PH] (ref 4.5–8)
PLATELET # BLD AUTO: 387 THOU/CMM (ref 140–350)
PMV BLD REES-ECKER: 8.3 FL (ref 7.5–11.3)
POTASSIUM SERPL-SCNC: 4.7 MMOL/L (ref 3.5–5.2)
PROT 24H UR-MRATE: NEGATIVE MG/DL
PROT SERPL-MCNC: 6.1 G/DL (ref 6.3–8.3)
RBC # BLD AUTO: 3.3 MILL/CMM (ref 3.7–4.7)
RBC #/AREA URNS HPF: ABNORMAL /HPF (ref 0–2)
SL AMB POCT URINE COMMENT: ABNORMAL
SODIUM SERPL-SCNC: 131 MMOL/L (ref 135–145)
SP GR UR: 1.02 (ref 1–1.03)
SQUAMOUS #/AREA URNS HPF: >10 /LPF (ref 0–5)
T4 FREE SERPL-MCNC: 1.35 NG/DL (ref 0.61–1.12)
TRANS CELLS #/AREA URNS HPF: ABNORMAL /LPF (ref 0–1)
TSH SERPL-ACNC: 0.41 UIU/ML (ref 0.45–5.33)
WBC # BLD AUTO: 6.7 THOU/CMM (ref 4–10)
WBC #/AREA URNS HPF: ABNORMAL /HPF (ref 0–5)

## 2024-03-04 RX ORDER — LEVOTHYROXINE SODIUM 88 UG/1
88 TABLET ORAL DAILY
Qty: 30 TABLET | Refills: 3 | Status: SHIPPED | OUTPATIENT
Start: 2024-03-04

## 2024-03-05 NOTE — TELEPHONE ENCOUNTER
Spoke to pt, in the future she says rx's [facility] go [facility] but she will pick this one up from [facility]

## 2024-03-06 ENCOUNTER — TELEPHONE (OUTPATIENT)
Dept: INTERNAL MEDICINE CLINIC | Facility: CLINIC | Age: 84
End: 2024-03-06

## 2024-03-06 DIAGNOSIS — D50.9 IRON DEFICIENCY ANEMIA, UNSPECIFIED IRON DEFICIENCY ANEMIA TYPE: Primary | ICD-10-CM

## 2024-03-06 RX ORDER — FERROUS SULFATE 324(65)MG
324 TABLET, DELAYED RELEASE (ENTERIC COATED) ORAL
Qty: 90 TABLET | Refills: 3 | Status: SHIPPED | OUTPATIENT
Start: 2024-03-06 | End: 2024-06-04

## 2024-03-20 DIAGNOSIS — E03.9 ACQUIRED HYPOTHYROIDISM: ICD-10-CM

## 2024-03-20 RX ORDER — LEVOTHYROXINE SODIUM 88 UG/1
88 TABLET ORAL DAILY
Qty: 30 TABLET | Refills: 3 | Status: SHIPPED | OUTPATIENT
Start: 2024-03-20 | End: 2024-03-25 | Stop reason: SDUPTHER

## 2024-03-25 ENCOUNTER — TELEPHONE (OUTPATIENT)
Dept: INTERNAL MEDICINE CLINIC | Facility: CLINIC | Age: 84
End: 2024-03-25

## 2024-03-25 DIAGNOSIS — E03.9 ACQUIRED HYPOTHYROIDISM: ICD-10-CM

## 2024-03-25 RX ORDER — LEVOTHYROXINE SODIUM 88 UG/1
88 TABLET ORAL DAILY
Qty: 90 TABLET | Refills: 0 | Status: SHIPPED | OUTPATIENT
Start: 2024-03-25

## 2024-03-25 NOTE — TELEPHONE ENCOUNTER
He sent it to [facility] , [facility] , [facility]

## 2024-03-25 NOTE — TELEPHONE ENCOUNTER
Patient called and wants her levothyroxine 88 mg called [facility] that was on the voice mail today she is out of pills now     Last ov's 02/26/2024  Next ov's 10/21/2024     put in instructions for  88 mg , he decreased the dosage , that's what she said she should  be taking .

## 2024-04-15 DIAGNOSIS — E11.69 TYPE 2 DIABETES MELLITUS WITH OTHER SPECIFIED COMPLICATION, UNSPECIFIED WHETHER LONG TERM INSULIN USE (HCC): ICD-10-CM

## 2024-04-15 RX ORDER — LANCETS 33 GAUGE
1 EACH MISCELLANEOUS DAILY
Qty: 100 EACH | Refills: 3 | Status: SHIPPED | OUTPATIENT
Start: 2024-04-15

## 2024-04-15 RX ORDER — BLOOD SUGAR DIAGNOSTIC
1 STRIP MISCELLANEOUS DAILY
Qty: 100 EACH | Refills: 3 | Status: SHIPPED | OUTPATIENT
Start: 2024-04-15

## 2024-04-15 NOTE — TELEPHONE ENCOUNTER
Patient called and needs her medication refilled and sent to [facility] [address]     Last ov's 02/26/2024  Next ov's 10/21/2024

## 2024-04-24 LAB
LEFT EYE DIABETIC RETINOPATHY: NORMAL
RIGHT EYE DIABETIC RETINOPATHY: NORMAL
SEVERITY (EYE EXAM): NORMAL

## 2024-05-19 DIAGNOSIS — E03.9 ACQUIRED HYPOTHYROIDISM: ICD-10-CM

## 2024-05-20 ENCOUNTER — TELEPHONE (OUTPATIENT)
Dept: ADMINISTRATIVE | Facility: OTHER | Age: 84
End: 2024-05-20

## 2024-05-20 RX ORDER — LEVOTHYROXINE SODIUM 88 UG/1
88 TABLET ORAL DAILY
Qty: 90 TABLET | Refills: 1 | Status: SHIPPED | OUTPATIENT
Start: 2024-05-20

## 2024-05-20 NOTE — TELEPHONE ENCOUNTER
----- Message from Britt WEN sent at 5/16/2024 12:41 PM EDT -----  Regarding: dm eye  05/16/24 12:41 PM    Hello, our patient Juliana Tucker has had Diabetic Eye Exam completed/performed. Please assist in updating the patient chart by pulling the document from the Media Tab. The date of service is 04/24/2024.     Thank you,  Britt Mcmahon MA  [facility] [place]

## 2024-05-20 NOTE — TELEPHONE ENCOUNTER
Upon review of the In Basket request we were able to locate, review, and update the patient chart as requested for Diabetic Eye Exam.    Any additional questions or concerns should be emailed to the Practice Liaisons via the appropriate education email address, please do not reply via In Basket.    Thank you  Pati Guardado MA

## 2024-07-22 DIAGNOSIS — E11.69 TYPE 2 DIABETES MELLITUS WITH OTHER SPECIFIED COMPLICATION, UNSPECIFIED WHETHER LONG TERM INSULIN USE (HCC): ICD-10-CM

## 2024-07-22 NOTE — TELEPHONE ENCOUNTER
Medication: metFORMIN (GLUCOPHAGE) 500 mg tablet     Dose/Frequency:   Take 1 tablet (500 mg total) by mouth daily with breakfast       Quantity: 90    Pharmacy: [facility] - [address]     Office:   [x] PCP/Provider - Dr Ivy  [] Speciality/Provider -     Does the patient have enough for 3 days?   [x] Yes   [] No - Send as HP to POD     Pt wanted to make sure [facility] not [facility].

## 2024-09-09 DIAGNOSIS — E03.9 ACQUIRED HYPOTHYROIDISM: ICD-10-CM

## 2024-09-09 RX ORDER — LEVOTHYROXINE SODIUM 88 UG/1
88 TABLET ORAL DAILY
Qty: 90 TABLET | Refills: 1 | Status: SHIPPED | OUTPATIENT
Start: 2024-09-09

## 2024-09-13 DIAGNOSIS — E78.2 MIXED HYPERLIPIDEMIA: ICD-10-CM

## 2024-09-13 RX ORDER — ROSUVASTATIN CALCIUM 20 MG/1
20 TABLET, COATED ORAL EVERY EVENING
Qty: 90 TABLET | Refills: 0 | Status: SHIPPED | OUTPATIENT
Start: 2024-09-13

## 2024-09-13 NOTE — TELEPHONE ENCOUNTER
Medication: Rosuvastatin     Dose/Frequency: 20mg 1 tab day    Quantity: as directed    Pharmacy: [facility]     Office:   [x] PCP/Provider -   [] Speciality/Provider -     Does the patient have enough for 3 days?   [] Yes   [x] No - Send as HP to POD

## 2024-10-21 ENCOUNTER — OFFICE VISIT (OUTPATIENT)
Dept: INTERNAL MEDICINE CLINIC | Facility: CLINIC | Age: 84
End: 2024-10-21
Payer: MEDICARE

## 2024-10-21 VITALS
BODY MASS INDEX: 26.61 KG/M2 | OXYGEN SATURATION: 97 % | SYSTOLIC BLOOD PRESSURE: 141 MMHG | WEIGHT: 132 LBS | HEIGHT: 59 IN | TEMPERATURE: 98.4 F | HEART RATE: 77 BPM | DIASTOLIC BLOOD PRESSURE: 65 MMHG

## 2024-10-21 DIAGNOSIS — D50.8 OTHER IRON DEFICIENCY ANEMIA: ICD-10-CM

## 2024-10-21 DIAGNOSIS — C50.919 MALIGNANT NEOPLASM OF FEMALE BREAST, UNSPECIFIED ESTROGEN RECEPTOR STATUS, UNSPECIFIED LATERALITY, UNSPECIFIED SITE OF BREAST (HCC): ICD-10-CM

## 2024-10-21 DIAGNOSIS — Z98.890 HISTORY OF LEFT-SIDED CAROTID ENDARTERECTOMY: ICD-10-CM

## 2024-10-21 DIAGNOSIS — R26.2 AMBULATORY DYSFUNCTION: ICD-10-CM

## 2024-10-21 DIAGNOSIS — I48.0 PAF (PAROXYSMAL ATRIAL FIBRILLATION) (HCC): ICD-10-CM

## 2024-10-21 DIAGNOSIS — Z00.00 MEDICARE ANNUAL WELLNESS VISIT, SUBSEQUENT: ICD-10-CM

## 2024-10-21 DIAGNOSIS — E11.69 TYPE 2 DIABETES MELLITUS WITH OTHER SPECIFIED COMPLICATION, UNSPECIFIED WHETHER LONG TERM INSULIN USE (HCC): Primary | ICD-10-CM

## 2024-10-21 PROCEDURE — G0438 PPPS, INITIAL VISIT: HCPCS | Performed by: INTERNAL MEDICINE

## 2024-10-21 PROCEDURE — 99213 OFFICE O/P EST LOW 20 MIN: CPT | Performed by: INTERNAL MEDICINE

## 2024-10-21 RX ORDER — LOSARTAN POTASSIUM 50 MG/1
50 TABLET ORAL DAILY
COMMUNITY
Start: 2024-10-15

## 2024-10-21 NOTE — PROGRESS NOTES
Ambulatory Visit  Name: Juliana Tucker      : 1940      MRN: [id number]  Encounter Provider: Clark Ivy MD  Encounter Date: 10/21/2024   Encounter department: [facility] INTERNAL MEDICINE    Assessment & Plan  Other iron deficiency anemia         Type 2 diabetes mellitus with other specified complication, unspecified whether long term insulin use (HCC)    Lab Results   Component Value Date    HGBA1C 5.6 2024    HGBA1C 5.6 2024            Ambulatory dysfunction         Malignant neoplasm of female breast, unspecified estrogen receptor status, unspecified laterality, unspecified site of breast (HCC)         History of left-sided carotid endarterectomy            1. Type 2 diabetes mellitus with other specified complication, unspecified whether long term insulin use (HCC)      Sugars are stable continue current management.      2. Other iron deficiency anemia      Patient is on oral iron supplementation.      3. Ambulatory dysfunction        4. Malignant neoplasm of female breast, unspecified estrogen receptor status, unspecified laterality, unspecified site of breast (HCC)        5. History of left-sided carotid endarterectomy        6. PAF (paroxysmal atrial fibrillation) (HCC)      Stable following with cardiology continue Eliquis.      7. Medicare annual wellness visit, subsequent           Preventive health issues were discussed with patient, and age appropriate screening tests were ordered as noted in patient's After Visit Summary. Personalized health advice and appropriate referrals for health education or preventive services given if needed, as noted in patient's After Visit Summary.    History of Present Illness     HPI   Patient Care Team:  Clark Ivy MD as PCP - General (Internal Medicine)    Review of Systems   Constitutional:  Negative for appetite change, chills, fatigue and fever.   HENT:  Negative for sore throat and trouble swallowing.    Eyes:  Negative for  redness.   Respiratory:  Negative for shortness of breath.    Cardiovascular:  Negative for chest pain and palpitations.   Gastrointestinal:  Negative for abdominal pain, constipation and diarrhea.   Genitourinary:  Negative for dysuria and hematuria.   Musculoskeletal:  Positive for arthralgias and gait problem. Negative for back pain and neck pain.   Skin:  Negative for rash.   Neurological:  Negative for seizures, weakness and headaches.   Hematological:  Negative for adenopathy.   Psychiatric/Behavioral:  Negative for confusion. The patient is not nervous/anxious.      Medical History Reviewed by provider this encounter:       Annual Wellness Visit Questionnaire   Juliana is here for her Subsequent Wellness visit. Last Medicare Wellness visit information reviewed, patient interviewed and updates made to the record today.      Health Risk Assessment:   Patient rates overall health as good. Patient feels that their physical health rating is same. Patient is satisfied with their life. Eyesight was rated as same. Hearing was rated as same. Patient feels that their emotional and mental health rating is same. Patients states they are sometimes angry. Patient states they are sometimes unusually tired/fatigued. Pain experienced in the last 7 days has been a lot. Patient's pain rating has been 7/10. Patient states that she has experienced no weight loss or gain in last 6 months.     Depression Screening:   PHQ-2 Score: 0      Fall Risk Screening:   In the past year, patient has experienced: no history of falling in past year      Urinary Incontinence Screening:   Patient has not leaked urine accidently in the last six months.     Home Safety:  Patient has trouble with stairs inside or outside of their home. Patient has working smoke alarms and has working carbon monoxide detector. Home safety hazards include: none.     Nutrition:   Current diet is Regular.     Medications:   Patient is currently taking over-the-counter  supplements. OTC medications include: see medication list. Patient is able to manage medications.     Activities of Daily Living (ADLs)/Instrumental Activities of Daily Living (IADLs):   Walk and transfer into and out of bed and chair?: Yes  Dress and groom yourself?: Yes    Bathe or shower yourself?: Yes    Feed yourself? Yes  Do your laundry/housekeeping?: No  Manage your money, pay your bills and track your expenses?: Yes  Make your own meals?: Yes    Do your own shopping?: Yes    Previous Hospitalizations:   Any hospitalizations or ED visits within the last 12 months?: No      Advance Care Planning:   Living will: Yes    Durable POA for healthcare: Yes    Advanced directive: Yes    ACP document given: Yes      PREVENTIVE SCREENINGS      Cardiovascular Screening:    General: Screening Not Indicated and History Lipid Disorder      Diabetes Screening:     General: Screening Not Indicated and History Diabetes      Breast Cancer Screening:     General: History Breast Cancer      Cervical Cancer Screening:    General: Screening Not Indicated      Lung Cancer Screening:     General: Screening Not Indicated    Screening, Brief Intervention, and Referral to Treatment (SBIRT)    Screening  Typical number of drinks in a day: 0  Typical number of drinks in a week: 0  Interpretation: Low risk drinking behavior.    Single Item Drug Screening:  How often have you used an illegal drug (including marijuana) or a prescription medication for non-medical reasons in the past year? never    Single Item Drug Screen Score: 0  Interpretation: Negative screen for possible drug use disorder    Other Counseling Topics:   Car/seat belt/driving safety, skin self-exam, sunscreen and calcium and vitamin D intake and regular weightbearing exercise.     Social Determinants of Health     Financial Resource Strain: Low Risk  (12/22/2023)    [facility]    Overall Financial Resource  "Strain (CARDIA)     Difficulty of Paying Living Expenses: Not hard at all   Food Insecurity: No Food Insecurity (12/22/2023)    [facility]    Hunger Vital Sign     Worried About Running Out of Food in the Last Year: Never true     Ran Out of Food in the Last Year: Never true   Transportation Needs: No Transportation Needs (12/22/2023)    [facility]    PRAPARE - Transportation     Lack of Transportation (Medical): No     Lack of Transportation (Non-Medical): No   Housing Stability: Low Risk  (12/22/2023)    [facility]    Housing Stability Vital Sign     Unable to Pay for Housing in the Last Year: No     Number of Places Lived in the Last Year: 1     Unstable Housing in the Last Year: No     No results found.    Objective     /65 (BP Location: Left arm, Patient Position: Sitting, Cuff Size: Adult)   Pulse 77   Temp 98.4 °F (36.9 °C) (Temporal)   Ht 4' 11\" (1.499 m)   Wt 59.9 kg (132 lb)   SpO2 97%   BMI 26.66 kg/m²     Physical Exam  Vitals and nursing note reviewed.   Constitutional:       General: She is not in acute distress.     Appearance: She is well-developed.   HENT:      Head: Normocephalic and atraumatic.   Eyes:      Conjunctiva/sclera: Conjunctivae normal.   Cardiovascular:      Rate and Rhythm: Normal rate. Rhythm irregular.      Heart sounds: No murmur heard.  Pulmonary:      Effort: Pulmonary effort is normal. No respiratory distress.      Breath sounds: Normal breath sounds.   Abdominal:      Palpations: Abdomen is soft.      Tenderness: There is no abdominal tenderness.   Musculoskeletal:         General: No swelling.      Cervical back: Neck supple.   Skin:     General: Skin is warm and dry.      Capillary Refill: Capillary refill takes less than 2 seconds.   Neurological:      Mental Status: She is alert. "      Gait: Gait abnormal.   Psychiatric:         Mood and Affect: Mood normal.

## 2024-10-30 ENCOUNTER — TELEPHONE (OUTPATIENT)
Age: 84
End: 2024-10-30

## 2024-10-30 DIAGNOSIS — N30.00 ACUTE CYSTITIS WITHOUT HEMATURIA: Primary | ICD-10-CM

## 2024-10-30 DIAGNOSIS — R53.83 OTHER FATIGUE: ICD-10-CM

## 2024-10-30 DIAGNOSIS — Z71.89 GOALS OF CARE, COUNSELING/DISCUSSION: ICD-10-CM

## 2024-10-30 LAB
BACTERIA URNS QL MICRO: ABNORMAL
CAOX CRY #/AREA URNS HPF: PRESENT /[HPF]
GLUCOSE UR QL STRIP: NEGATIVE MG/DL
HGB UR QL STRIP: ABNORMAL MG/DL
KETONES UR QL STRIP: NEGATIVE MG/DL
LEUKOCYTE ESTERASE UR QL STRIP: ABNORMAL /UL
MUCOUS THREADS URNS QL MICRO: ABNORMAL
NITRITE UR QL STRIP: POSITIVE
PH UR: 5 [PH] (ref 4.5–8)
PROT 24H UR-MRATE: ABNORMAL MG/DL
RBC #/AREA URNS HPF: >100 /HPF (ref 0–2)
SL AMB POCT URINE COMMENT: ABNORMAL
SP GR UR: 1.02 (ref 1–1.03)
SQUAMOUS #/AREA URNS HPF: >10 /LPF (ref 0–5)
WBC #/AREA URNS HPF: >100 /HPF (ref 0–5)

## 2024-10-30 NOTE — TELEPHONE ENCOUNTER
Juliana would like to have a urine test order to be completed at [facility] to check for a UTI. She also mentioned that she is ok to pick it up. She would like to do this asap. She has plans around 12. Please call

## 2024-11-01 ENCOUNTER — TELEPHONE (OUTPATIENT)
Dept: INTERNAL MEDICINE CLINIC | Facility: CLINIC | Age: 84
End: 2024-11-01

## 2024-11-01 DIAGNOSIS — N30.00 ACUTE CYSTITIS WITHOUT HEMATURIA: Primary | ICD-10-CM

## 2024-11-01 LAB — LEGIONELLA SPEC CULT: NORMAL

## 2024-11-01 RX ORDER — CEPHALEXIN 500 MG/1
500 CAPSULE ORAL 3 TIMES DAILY
Qty: 15 CAPSULE | Refills: 0 | Status: SHIPPED | OUTPATIENT
Start: 2024-11-01 | End: 2024-11-06

## 2024-11-01 RX ORDER — NITROFURANTOIN 25; 75 MG/1; MG/1
100 CAPSULE ORAL 2 TIMES DAILY
Qty: 10 CAPSULE | Refills: 0 | Status: SHIPPED | OUTPATIENT
Start: 2024-11-01 | End: 2024-11-01

## 2024-11-01 RX ORDER — NITROFURANTOIN 25; 75 MG/1; MG/1
100 CAPSULE ORAL 2 TIMES DAILY
Qty: 10 CAPSULE | Refills: 0 | Status: SHIPPED | OUTPATIENT
Start: 2024-11-01 | End: 2024-11-06

## 2024-11-07 DIAGNOSIS — E78.2 MIXED HYPERLIPIDEMIA: ICD-10-CM

## 2024-11-08 RX ORDER — ROSUVASTATIN CALCIUM 20 MG/1
20 TABLET, COATED ORAL EVERY EVENING
Qty: 90 TABLET | Refills: 0 | Status: SHIPPED | OUTPATIENT
Start: 2024-11-08

## 2024-12-05 ENCOUNTER — TELEPHONE (OUTPATIENT)
Dept: INTERNAL MEDICINE CLINIC | Facility: CLINIC | Age: 84
End: 2024-12-05

## 2024-12-05 DIAGNOSIS — E78.2 MIXED HYPERLIPIDEMIA: ICD-10-CM

## 2024-12-05 NOTE — TELEPHONE ENCOUNTER
Spoke to [facility] and no PA is required for medication. Rep. stated an updated script will need to be placed for 90 tabs per 90 days in order to refill and ship out.             unknown

## 2024-12-06 RX ORDER — ROSUVASTATIN CALCIUM 20 MG/1
20 TABLET, COATED ORAL EVERY EVENING
Qty: 90 TABLET | Refills: 0 | Status: SHIPPED | OUTPATIENT
Start: 2024-12-06

## 2024-12-27 ENCOUNTER — NURSE TRIAGE (OUTPATIENT)
Age: 84
End: 2024-12-27

## 2024-12-27 ENCOUNTER — TELEPHONE (OUTPATIENT)
Age: 84
End: 2024-12-27

## 2024-12-27 DIAGNOSIS — G47.00 INSOMNIA: Primary | ICD-10-CM

## 2024-12-27 DIAGNOSIS — G47.00 INSOMNIA: ICD-10-CM

## 2024-12-27 RX ORDER — ZOLPIDEM TARTRATE 5 MG/1
5 TABLET ORAL
Qty: 30 TABLET | Refills: 0 | Status: SHIPPED | OUTPATIENT
Start: 2024-12-27

## 2024-12-27 NOTE — TELEPHONE ENCOUNTER
Please resend medication to [facility] pharmacy   zolpidem (AMBIEN) 5 mg tablet

## 2024-12-27 NOTE — TELEPHONE ENCOUNTER
"Patient calling back again about rerouting her Ambien prescription from mail order pharmacy to [facility] [address] [place]. Informed patient that CTS RN unable to reroute to another pharmacy without provider approval. Advised patient that provider will address on Monday morning. Patient ended the call.    Reason for Disposition   Prescription prescribed recently is not at pharmacy and triager has access to patient's EMR and prescription is recorded in the EMR    Answer Assessment - Initial Assessment Questions  1. NAME of MEDICINE: \"What medicine(s) are you calling about?\"      Ambien 5mg  2. QUESTION: \"What is your question?\" (e.g., double dose of medicine, side effect)      Sent to wrong pharmacy  3. PRESCRIBER: \"Who prescribed the medicine?\" Reason: if prescribed by specialist, call should be referred to that group.      Bandar Cespedes MD  4. SYMPTOMS: \"Do you have any symptoms?\" If Yes, ask: \"What symptoms are you having?\"  \"How bad are the symptoms (e.g., mild, moderate, severe)      No    Protocols used: Medication Question Call-Adult-[place]    "

## 2024-12-27 NOTE — TELEPHONE ENCOUNTER
Routed the request to the office to have patient Ambien sent to [facility] and not her mailorder as she needs it tonight

## 2024-12-27 NOTE — TELEPHONE ENCOUNTER
Patient called back again after office hours regarding her Ambien medication, due to the medication being a controlled substance our [facility]es are not able to re-send to alternative pharmacy, We have epic secure messaged the on-call provider for the office, requesting they please re-send the prescription to [facility], [address], on-call will send over and pharmacy should be reaching out to patient. Thank you

## 2024-12-27 NOTE — TELEPHONE ENCOUNTER
Patient would like to get some Ambien for sleeping she is having trouble sleeping at night and would like to try some Ambien for sleeping. Please feel free to reach out to the patient. Thank you

## 2024-12-27 NOTE — TELEPHONE ENCOUNTER
Pt called to check the status of her request for three (3) Ambien tablets prescribed to help her sleep. Pt stated she was didn't fall asleep until 5AM and melatonin does not work.

## 2024-12-30 DIAGNOSIS — G47.00 INSOMNIA: ICD-10-CM

## 2024-12-30 RX ORDER — ZOLPIDEM TARTRATE 5 MG/1
5 TABLET ORAL
Qty: 30 TABLET | Refills: 0 | Status: SHIPPED | OUTPATIENT
Start: 2024-12-30

## 2025-01-30 DIAGNOSIS — E78.2 MIXED HYPERLIPIDEMIA: ICD-10-CM

## 2025-01-31 RX ORDER — ROSUVASTATIN CALCIUM 20 MG/1
20 TABLET, COATED ORAL EVERY EVENING
Qty: 90 TABLET | Refills: 3 | Status: SHIPPED | OUTPATIENT
Start: 2025-01-31

## 2025-02-02 DIAGNOSIS — E11.69 TYPE 2 DIABETES MELLITUS WITH OTHER SPECIFIED COMPLICATION, UNSPECIFIED WHETHER LONG TERM INSULIN USE (HCC): ICD-10-CM

## 2025-02-19 LAB
LEFT EYE DIABETIC RETINOPATHY: POSITIVE
RIGHT EYE DIABETIC RETINOPATHY: POSITIVE
SEVERITY (EYE EXAM): NORMAL

## 2025-02-20 ENCOUNTER — TELEPHONE (OUTPATIENT)
Dept: ADMINISTRATIVE | Facility: OTHER | Age: 85
End: 2025-02-20

## 2025-02-20 NOTE — TELEPHONE ENCOUNTER
02/20/25 11:22 AM     Chart reviewed for Diabetic Eye Exam was/were submitted to the patient's insurance.     Sandra Limon   [facility]

## 2025-02-20 NOTE — TELEPHONE ENCOUNTER
Upon review of the In Basket request we were able to locate, review, and update the patient chart as requested for Diabetic Eye Exam.    Any additional questions or concerns should be emailed to the Practice Liaisons via the appropriate education email address, please do not reply via In Basket.    Thank you  Sandra Limon   [facility]

## 2025-02-20 NOTE — TELEPHONE ENCOUNTER
----- Message from Taylor BARRETT sent at 2/19/2025 11:46 AM EST -----  Received report for eye exam , sent to [facility]

## 2025-02-21 ENCOUNTER — RA CDI HCC (OUTPATIENT)
Dept: OTHER | Facility: HOSPITAL | Age: 85
End: 2025-02-21

## 2025-03-04 ENCOUNTER — TELEPHONE (OUTPATIENT)
Age: 85
End: 2025-03-04

## 2025-03-04 NOTE — TELEPHONE ENCOUNTER
Akanksha From [employer] faxed medicare written order for freestyle Ray on 2/28, 3/3 and 3/4. Puneet complete and Fax back ASAP.  Puneet call her once you receive the form     Thank you!!

## 2025-03-06 NOTE — TELEPHONE ENCOUNTER
Akanksha called back  looking for the completed fax. It was sent to Dr. Ivy as per Crystal. Please complete and fax back ASAP tomorrow is the last date for it.   Tried reaching out to the office as well.  Fax # [phone]  Thank you!!

## 2025-03-07 NOTE — TELEPHONE ENCOUNTER
Turner from [facility] [employer] [place] called again looking for the order for the Freestyle Ray.

## 2025-03-07 NOTE — TELEPHONE ENCOUNTER
Spoke to company to let them know pt has not been seen since oct 2024, needs to be seen in office as per Dr. Ivy for an appt. They will be reaching out to patient to let her know to call office for an appt

## 2025-03-18 DIAGNOSIS — E03.9 ACQUIRED HYPOTHYROIDISM: ICD-10-CM

## 2025-03-19 ENCOUNTER — TELEPHONE (OUTPATIENT)
Age: 85
End: 2025-03-19

## 2025-03-19 RX ORDER — LEVOTHYROXINE SODIUM 88 UG/1
88 TABLET ORAL DAILY
Qty: 90 TABLET | Refills: 0 | Status: SHIPPED | OUTPATIENT
Start: 2025-03-19

## 2025-03-19 NOTE — TELEPHONE ENCOUNTER
Patient called stating she had new patient appointment yesterday with Dr Bowers internal med from [facility].  Provider is requesting that patient's medical records be faxed to office at [phone].  Please advise.

## 2025-04-20 DIAGNOSIS — E11.69 TYPE 2 DIABETES MELLITUS WITH OTHER SPECIFIED COMPLICATION, UNSPECIFIED WHETHER LONG TERM INSULIN USE (HCC): ICD-10-CM

## 2025-04-21 RX ORDER — LANCETS 33 GAUGE
EACH MISCELLANEOUS DAILY
Qty: 100 EACH | Refills: 1 | Status: SHIPPED | OUTPATIENT
Start: 2025-04-21

## 2025-04-25 DIAGNOSIS — E11.69 TYPE 2 DIABETES MELLITUS WITH OTHER SPECIFIED COMPLICATION, UNSPECIFIED WHETHER LONG TERM INSULIN USE (HCC): ICD-10-CM

## 2025-04-25 RX ORDER — BLOOD SUGAR DIAGNOSTIC
STRIP MISCELLANEOUS DAILY
Qty: 100 STRIP | Refills: 1 | Status: SHIPPED | OUTPATIENT
Start: 2025-04-25

## 2025-07-16 ENCOUNTER — OFFICE VISIT (OUTPATIENT)
Dept: URGENT CARE | Age: 85
End: 2025-07-16
Payer: MEDICARE

## 2025-07-16 ENCOUNTER — APPOINTMENT (OUTPATIENT)
Dept: RADIOLOGY | Age: 85
End: 2025-07-16
Payer: MEDICARE

## 2025-07-16 VITALS
TEMPERATURE: 98.1 F | HEART RATE: 89 BPM | OXYGEN SATURATION: 98 % | SYSTOLIC BLOOD PRESSURE: 157 MMHG | RESPIRATION RATE: 18 BRPM | DIASTOLIC BLOOD PRESSURE: 66 MMHG

## 2025-07-16 DIAGNOSIS — R07.81 RIB PAIN: ICD-10-CM

## 2025-07-16 DIAGNOSIS — R07.81 RIB PAIN: Primary | ICD-10-CM

## 2025-07-16 PROCEDURE — 71101 X-RAY EXAM UNILAT RIBS/CHEST: CPT

## 2025-07-16 PROCEDURE — 99214 OFFICE O/P EST MOD 30 MIN: CPT

## 2025-07-16 PROCEDURE — G0463 HOSPITAL OUTPT CLINIC VISIT: HCPCS

## 2025-07-16 RX ORDER — LIDOCAINE 50 MG/G
1 PATCH TOPICAL DAILY
Qty: 30 PATCH | Refills: 0 | Status: SHIPPED | OUTPATIENT
Start: 2025-07-16

## 2025-07-16 NOTE — PROGRESS NOTES
[facility]  Name: Juliana Tucker      : 1940      MRN: [id number]  Encounter Provider: ADAIR Bowman  Encounter Date: 2025   [facility]  :  X-rays reviewed, no acute abnormality noted, awaiting official read.   Please begin lidocaine patches as directed, may take Tylenol as needed.   Follow up with PCP if no relief within one week.   Assessment & Plan  Rib pain    Orders:    XR ribs right w pa chest min 3 views; Future    lidocaine (Lidoderm) 5 %; Apply 1 patch topically daily over 12 hours Remove & Discard patch within 12 hours or as directed by MD        Patient Instructions  Patient Education     Rib Fracture or Bruised Rib ED   General Information   You came to the Emergency Department (ED) for broken or bruised ribs. The medical name for this is rib fracture or rib contusion. Your ribs are a group of bones that wrap around your chest. They protect the organs inside your chest like your heart and lungs. Most broken or bruised ribs happen when you fall onto your chest or are hit on the chest. A severe cough or doing the same motion over and over can also cause bruised or broken ribs. It can take 6 to 8 weeks for broken or bruised ribs to heal and be free of pain.  What care is needed at home?   Call your regular doctor to let them know you were in the ED. Make a follow-up appointment if you were told to.  Take 10 to 15 slow deep breaths at least 4 times each day. This will lower your chances of getting a lung infection. Use your incentive spirometer as you were told if you were given one. An incentive spirometer is a tool that measures how deeply you can breathe in.  Hold a pillow to your chest to ease the pain when you take deep breaths, sneeze, cough, or laugh. This will help ease the pain in your ribs.  It may hurt less to sleep in a reclined position. You can also sleep with your head and shoulders propped up on pillows.  You may want  to take medicines like ibuprofen or naproxen for swelling and pain. These are nonsteroidal anti-inflammatory drugs (NSAIDS).  Ice may help you ease pain and swelling.  Place an ice pack or a bag of frozen vegetables wrapped in a towel over the painful part. Never put ice right on the skin. Do not leave the ice on more than 10 to 15 minutes at a time. Use for the first 24 to 48 hours after an injury.  If you smoke, try to quit. Broken bones take longer to heal if you smoke.  When do I need to get emergency help?   Call for an ambulance right away if:   It is getting harder and harder to breathe.  You are so short of breath you cannot talk in a full sentence.  You develop severe pain in your chest, back, or neck.  Return to the ED if:   You start to cough up blood or yellow or green mucus.  You have a fever of 100.4°F (38°C) or higher or chills.  You are very weak or light-headed or feel like you might pass out.  When do I need to call the doctor?   You have very bad pain that is not helped by your medicines.  You have new or worsening symptoms.  Last Reviewed Date   2020-09-16  Consumer Information Use and Disclaimer   This generalized information is a limited summary of diagnosis, treatment, and/or medication information. It is not meant to be comprehensive and should be used as a tool to help the user understand and/or assess potential diagnostic and treatment options. It does NOT include all information about conditions, treatments, medications, side effects, or risks that may apply to a specific patient. It is not intended to be medical advice or a substitute for the medical advice, diagnosis, or treatment of a health care provider based on the health care provider's examination and assessment of a patient’s specific and unique circumstances. Patients must speak with a health care provider for complete information about their health, medical questions, and treatment options, including any risks or benefits  "regarding use of medications. This information does not endorse any treatments or medications as safe, effective, or approved for treating a specific patient. [employer] and its affiliates disclaim any warranty or liability relating to this information or the use thereof. The use of this information is governed by the Terms of Use, available at https://www.[employer]cept/en/know/clinical-effectiveness-terms   Copyright   Follow up with PCP in 3-5 days.  Proceed to  ER if symptoms worsen.    If tests are performed, our office will contact you with results only if changes need to made to the care plan discussed with you at the visit. You can review your full results on [facility] MyChart.    Chief Complaint:   Chief Complaint   Patient presents with    Flank Pain     INJURED ON SUNDAY. Right Flank pain. She felt a pop.     History of Present Illness   Chest Pain   This is a new problem. The current episode started in the past 7 days (x 4 days-was reaching over shelf in grocery store and felt a \"pop\" under right breast.). The onset quality is sudden. The problem occurs constantly. The problem has been gradually worsening. The pain is present in the lateral region (right chest wall). The quality of the pain is described as sharp. The pain does not radiate. Associated symptoms include sputum production. Pertinent negatives include no abdominal pain, back pain, claudication, cough, diaphoresis, dizziness, exertional chest pressure, fever, headaches, hemoptysis, irregular heartbeat, leg pain, lower extremity edema, malaise/fatigue, nausea, near-syncope, numbness, orthopnea, palpitations, PND, shortness of breath, syncope, vomiting or weakness. The pain is aggravated by movement. She has tried acetaminophen for the symptoms. The treatment provided no relief. Past medical history comments: Multiple rib fractures.         Review of Systems   Constitutional:  Negative for diaphoresis, fatigue, fever and malaise/fatigue. "   HENT:  Negative for congestion, ear discharge, ear pain, postnasal drip, rhinorrhea, sinus pressure, sinus pain, sneezing and sore throat.    Eyes: Negative.  Negative for pain, discharge, redness and itching.   Respiratory:  Positive for sputum production. Negative for apnea, cough, hemoptysis, choking, chest tightness, shortness of breath, wheezing and stridor.    Cardiovascular:  Positive for chest pain. Negative for palpitations, orthopnea, claudication, leg swelling, syncope, PND and near-syncope.   Gastrointestinal: Negative.  Negative for abdominal pain, diarrhea, nausea and vomiting.   Endocrine: Negative.  Negative for polydipsia, polyphagia and polyuria.   Genitourinary: Negative.  Negative for decreased urine volume and flank pain.   Musculoskeletal: Negative.  Negative for arthralgias, back pain, myalgias, neck pain and neck stiffness.   Skin: Negative.  Negative for color change and rash.   Allergic/Immunologic: Negative.  Negative for environmental allergies.   Neurological: Negative.  Negative for dizziness, facial asymmetry, weakness, light-headedness, numbness and headaches.   Hematological: Negative.  Negative for adenopathy.   Psychiatric/Behavioral: Negative.       Past Medical History   Past Medical History[1]  Past Surgical History[2]  Family History[3]  she reports that she has never smoked. She has never used smokeless tobacco. She reports current alcohol use. She reports that she does not currently use drugs after having used the following drugs: Marijuana.  Current Outpatient Medications   Medication Instructions    ALPRAZolam (XANAX) 0.25 mg, Oral, Daily PRN    Cholecalciferol 50 MCG (2000 UT) CAPS 1 capsule, Oral, Daily    cyanocobalamin (VITAMIN B-12) 1,000 mcg, Oral, Daily    Eliquis 5 mg, Oral, 2 times daily    Fe Asp Gly-Fe Polysac-Suc Ac-C (FERREX 150 PLUS PO) Oral, Daily    glucose blood (OneTouch Verio) test strip Daily    glucose blood test strip  ONE ONE TIME DAILY     Lancets  (OneTouch Delica Plus [id number]) MISC Daily    levothyroxine 88 mcg, Oral, Daily    lidocaine (Lidoderm) 5 % 1 patch, Topical, Daily, Remove & Discard patch within 12 hours or as directed by MD    losartan (COZAAR) 50 mg, Oral, Daily    losartan (COZAAR) 50 mg, Oral, Daily    Magnesium 250 mg, Oral, Daily after breakfast    metFORMIN (GLUCOPHAGE) 500 mg, Oral, Daily with breakfast    metoprolol succinate (TOPROL-XL) 25 mg, Oral, Daily    oxybutynin (DITROPAN-XL) 10 mg, Oral, Every morning    rosuvastatin (CRESTOR) 20 mg, Oral, Every evening    traMADol (ULTRAM) 50 mg tablet Every 6 hours PRN    zolpidem (AMBIEN) 5 mg, Oral, Daily at bedtime PRN   Allergies[4]     Objective   /66   Pulse 89   Temp 98.1 °F (36.7 °C) (Tympanic)   Resp 18   SpO2 98%      Physical Exam  Vitals and nursing note reviewed.   Constitutional:       General: She is not in acute distress.     Appearance: She is well-developed. She is not ill-appearing, toxic-appearing or diaphoretic.      Interventions: She is not intubated.  HENT:      Head: Normocephalic and atraumatic.     Eyes:      Conjunctiva/sclera: Conjunctivae normal.       Cardiovascular:      Rate and Rhythm: Normal rate and regular rhythm.      Heart sounds: Normal heart sounds, S1 normal and S2 normal. Heart sounds not distant. No murmur heard.  Pulmonary:      Effort: Pulmonary effort is normal. No tachypnea, bradypnea, accessory muscle usage, prolonged expiration, respiratory distress or retractions. She is not intubated.      Breath sounds: Normal breath sounds. No stridor, decreased air movement or transmitted upper airway sounds. No decreased breath sounds, wheezing, rhonchi or rales.   Chest:      Chest wall: Tenderness present. No mass, lacerations, deformity, swelling, crepitus or edema. There is no dullness to percussion.   Breasts:     Breasts are symmetrical.        Comments: (+) TTP right ribs under right breast, no appreciable swelling, step-off deformity or  "ecchymosis/rash.   Abdominal:      Palpations: Abdomen is soft.      Tenderness: There is no abdominal tenderness.     Musculoskeletal:         General: No swelling.      Cervical back: Neck supple.     Skin:     General: Skin is warm and dry.      Capillary Refill: Capillary refill takes less than 2 seconds.     Neurological:      Mental Status: She is alert.     Psychiatric:         Mood and Affect: Mood normal.         Portions of the record may have been created with voice recognition software.  Occasional wrong word or \"sound a like\" substitutions may have occurred due to the inherent limitations of voice recognition software.  Read the chart carefully and recognize, using context, where substitutions have occurred.       [1]   Past Medical History:  Diagnosis Date    Arthritis     Breast CA (HCC)     Diabetes mellitus (HCC)     Disease of thyroid gland     Heart attack (HCC)     Hypertension     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HCC)    [2]   Past Surgical History:  Procedure Laterality Date    BREAST LUMPECTOMY      CAROTID ARTERY - SUBCLAVIAN ARTERY BYPASS GRAFT      CARPAL TUNNEL RELEASE      CARPAL TUNNEL RELEASE Bilateral     FEMUR SURGERY      KNEE SURGERY      SHOULDER DEBRIDEMENT      TONSILLECTOMY      TOTAL ABDOMINAL HYSTERECTOMY W/ BILATERAL SALPINGOOPHORECTOMY     [3]   Family History  Problem Relation Name Age of Onset    Stroke Mother      Heart attack Father      Heart attack Brother      Arthritis Family      Heart disease Family      Hypertension Family      Cancer Family     [4] No Known Allergies    "

## 2025-07-16 NOTE — PATIENT INSTRUCTIONS
X-rays reviewed, no acute abnormality noted, awaiting official read.   Please begin lidocaine patches as directed, may take Tylenol as needed.   Follow up with PCP if no relief within one week.